# Patient Record
Sex: FEMALE | Race: WHITE | NOT HISPANIC OR LATINO | Employment: UNEMPLOYED | URBAN - METROPOLITAN AREA
[De-identification: names, ages, dates, MRNs, and addresses within clinical notes are randomized per-mention and may not be internally consistent; named-entity substitution may affect disease eponyms.]

---

## 2020-12-30 ENCOUNTER — ANESTHESIA EVENT (OUTPATIENT)
Dept: SURGERY | Facility: MEDICAL CENTER | Age: 50
DRG: 853 | End: 2020-12-30
Payer: COMMERCIAL

## 2020-12-30 ENCOUNTER — HOSPITAL ENCOUNTER (INPATIENT)
Facility: MEDICAL CENTER | Age: 50
LOS: 8 days | DRG: 853 | End: 2021-01-07
Attending: EMERGENCY MEDICINE | Admitting: SURGERY
Payer: COMMERCIAL

## 2020-12-30 ENCOUNTER — HOSPITAL ENCOUNTER (OUTPATIENT)
Dept: RADIOLOGY | Facility: MEDICAL CENTER | Age: 50
End: 2020-12-30
Payer: COMMERCIAL

## 2020-12-30 ENCOUNTER — ANESTHESIA (OUTPATIENT)
Dept: SURGERY | Facility: MEDICAL CENTER | Age: 50
DRG: 853 | End: 2020-12-30
Payer: COMMERCIAL

## 2020-12-30 DIAGNOSIS — A41.9 SEPSIS WITHOUT ACUTE ORGAN DYSFUNCTION, DUE TO UNSPECIFIED ORGANISM (HCC): ICD-10-CM

## 2020-12-30 DIAGNOSIS — U07.1 COVID-19: ICD-10-CM

## 2020-12-30 DIAGNOSIS — R19.8 PERFORATED VISCUS: ICD-10-CM

## 2020-12-30 DIAGNOSIS — K27.5 PERFORATED ULCER (HCC): ICD-10-CM

## 2020-12-30 DIAGNOSIS — J96.01 ACUTE RESPIRATORY FAILURE WITH HYPOXIA (HCC): ICD-10-CM

## 2020-12-30 DIAGNOSIS — A41.9 SEPTIC SHOCK (HCC): ICD-10-CM

## 2020-12-30 DIAGNOSIS — I15.9 SECONDARY HYPERTENSION: ICD-10-CM

## 2020-12-30 DIAGNOSIS — R65.21 SEPTIC SHOCK (HCC): ICD-10-CM

## 2020-12-30 LAB
ALBUMIN SERPL BCP-MCNC: 3.4 G/DL (ref 3.2–4.9)
ALBUMIN/GLOB SERPL: 1 G/DL
ALP SERPL-CCNC: 85 U/L (ref 30–99)
ALT SERPL-CCNC: 21 U/L (ref 2–50)
ANION GAP SERPL CALC-SCNC: 12 MMOL/L (ref 7–16)
ANISOCYTOSIS BLD QL SMEAR: ABNORMAL
AST SERPL-CCNC: 23 U/L (ref 12–45)
BASOPHILS # BLD AUTO: 0.2 % (ref 0–1.8)
BASOPHILS # BLD: 0.03 K/UL (ref 0–0.12)
BILIRUB SERPL-MCNC: <0.2 MG/DL (ref 0.1–1.5)
BUN SERPL-MCNC: 27 MG/DL (ref 8–22)
CALCIUM SERPL-MCNC: 8.1 MG/DL (ref 8.5–10.5)
CHLORIDE SERPL-SCNC: 112 MMOL/L (ref 96–112)
CO2 SERPL-SCNC: 16 MMOL/L (ref 20–33)
COMMENT 1642: NORMAL
CREAT SERPL-MCNC: 2.08 MG/DL (ref 0.5–1.4)
EKG IMPRESSION: NORMAL
EOSINOPHIL # BLD AUTO: 0 K/UL (ref 0–0.51)
EOSINOPHIL NFR BLD: 0 % (ref 0–6.9)
ERYTHROCYTE [DISTWIDTH] IN BLOOD BY AUTOMATED COUNT: 54.1 FL (ref 35.9–50)
GLOBULIN SER CALC-MCNC: 3.3 G/DL (ref 1.9–3.5)
GLUCOSE SERPL-MCNC: 186 MG/DL (ref 65–99)
HCT VFR BLD AUTO: 43.5 % (ref 37–47)
HGB BLD-MCNC: 13 G/DL (ref 12–16)
IMM GRANULOCYTES # BLD AUTO: 0.04 K/UL (ref 0–0.11)
IMM GRANULOCYTES NFR BLD AUTO: 0.3 % (ref 0–0.9)
LACTATE BLD-SCNC: 2.2 MMOL/L (ref 0.5–2)
LYMPHOCYTES # BLD AUTO: 1.01 K/UL (ref 1–4.8)
LYMPHOCYTES NFR BLD: 7.9 % (ref 22–41)
MCH RBC QN AUTO: 28.8 PG (ref 27–33)
MCHC RBC AUTO-ENTMCNC: 29.9 G/DL (ref 33.6–35)
MCV RBC AUTO: 96.2 FL (ref 81.4–97.8)
MONOCYTES # BLD AUTO: 0.43 K/UL (ref 0–0.85)
MONOCYTES NFR BLD AUTO: 3.4 % (ref 0–13.4)
MORPHOLOGY BLD-IMP: NORMAL
NEUTROPHILS # BLD AUTO: 11.21 K/UL (ref 2–7.15)
NEUTROPHILS NFR BLD: 88.2 % (ref 44–72)
NRBC # BLD AUTO: 0 K/UL
NRBC BLD-RTO: 0 /100 WBC
OVALOCYTES BLD QL SMEAR: NORMAL
PLATELET # BLD AUTO: 253 K/UL (ref 164–446)
PLATELET BLD QL SMEAR: NORMAL
PMV BLD AUTO: 10.9 FL (ref 9–12.9)
POIKILOCYTOSIS BLD QL SMEAR: NORMAL
POLYCHROMASIA BLD QL SMEAR: NORMAL
POTASSIUM SERPL-SCNC: 5.4 MMOL/L (ref 3.6–5.5)
PROT SERPL-MCNC: 6.7 G/DL (ref 6–8.2)
RBC # BLD AUTO: 4.52 M/UL (ref 4.2–5.4)
RBC BLD AUTO: PRESENT
SODIUM SERPL-SCNC: 140 MMOL/L (ref 135–145)
TROPONIN T SERPL-MCNC: 16 NG/L (ref 6–19)
WBC # BLD AUTO: 12.7 K/UL (ref 4.8–10.8)

## 2020-12-30 PROCEDURE — 700101 HCHG RX REV CODE 250: Performed by: EMERGENCY MEDICINE

## 2020-12-30 PROCEDURE — 84484 ASSAY OF TROPONIN QUANT: CPT

## 2020-12-30 PROCEDURE — 700101 HCHG RX REV CODE 250: Performed by: ANESTHESIOLOGY

## 2020-12-30 PROCEDURE — 96366 THER/PROPH/DIAG IV INF ADDON: CPT

## 2020-12-30 PROCEDURE — 500367 HCHG DRAIN KIT, HEMOVAC: Performed by: SURGERY

## 2020-12-30 PROCEDURE — 02HV33Z INSERTION OF INFUSION DEVICE INTO SUPERIOR VENA CAVA, PERCUTANEOUS APPROACH: ICD-10-PCS | Performed by: ANESTHESIOLOGY

## 2020-12-30 PROCEDURE — 160009 HCHG ANES TIME/MIN: Performed by: SURGERY

## 2020-12-30 PROCEDURE — 36415 COLL VENOUS BLD VENIPUNCTURE: CPT

## 2020-12-30 PROCEDURE — 160042 HCHG SURGERY MINUTES - EA ADDL 1 MIN LEVEL 5: Performed by: SURGERY

## 2020-12-30 PROCEDURE — 700111 HCHG RX REV CODE 636 W/ 250 OVERRIDE (IP): Performed by: ANESTHESIOLOGY

## 2020-12-30 PROCEDURE — 0DU40JZ SUPPLEMENT ESOPHAGOGASTRIC JUNCTION WITH SYNTHETIC SUBSTITUTE, OPEN APPROACH: ICD-10-PCS | Performed by: SURGERY

## 2020-12-30 PROCEDURE — C1765 ADHESION BARRIER: HCPCS | Performed by: SURGERY

## 2020-12-30 PROCEDURE — 99292 CRITICAL CARE ADDL 30 MIN: CPT | Performed by: INTERNAL MEDICINE

## 2020-12-30 PROCEDURE — 96365 THER/PROPH/DIAG IV INF INIT: CPT

## 2020-12-30 PROCEDURE — 160002 HCHG RECOVERY MINUTES (STAT): Performed by: SURGERY

## 2020-12-30 PROCEDURE — 501838 HCHG SUTURE GENERAL: Performed by: SURGERY

## 2020-12-30 PROCEDURE — 700105 HCHG RX REV CODE 258: Performed by: ANESTHESIOLOGY

## 2020-12-30 PROCEDURE — 80053 COMPREHEN METABOLIC PANEL: CPT

## 2020-12-30 PROCEDURE — 99291 CRITICAL CARE FIRST HOUR: CPT | Performed by: INTERNAL MEDICINE

## 2020-12-30 PROCEDURE — 160031 HCHG SURGERY MINUTES - 1ST 30 MINS LEVEL 5: Performed by: SURGERY

## 2020-12-30 PROCEDURE — 0FB00ZX EXCISION OF LIVER, OPEN APPROACH, DIAGNOSTIC: ICD-10-PCS | Performed by: SURGERY

## 2020-12-30 PROCEDURE — 502704 HCHG DEVICE, LIGASURE IMPACT: Performed by: SURGERY

## 2020-12-30 PROCEDURE — 87040 BLOOD CULTURE FOR BACTERIA: CPT

## 2020-12-30 PROCEDURE — 700105 HCHG RX REV CODE 258: Performed by: EMERGENCY MEDICINE

## 2020-12-30 PROCEDURE — 99291 CRITICAL CARE FIRST HOUR: CPT

## 2020-12-30 PROCEDURE — 85025 COMPLETE CBC W/AUTO DIFF WBC: CPT

## 2020-12-30 PROCEDURE — 160035 HCHG PACU - 1ST 60 MINS PHASE I: Performed by: SURGERY

## 2020-12-30 PROCEDURE — 88307 TISSUE EXAM BY PATHOLOGIST: CPT

## 2020-12-30 PROCEDURE — 93005 ELECTROCARDIOGRAM TRACING: CPT | Performed by: EMERGENCY MEDICINE

## 2020-12-30 PROCEDURE — 770022 HCHG ROOM/CARE - ICU (200)

## 2020-12-30 PROCEDURE — 83605 ASSAY OF LACTIC ACID: CPT

## 2020-12-30 PROCEDURE — 03HY32Z INSERTION OF MONITORING DEVICE INTO UPPER ARTERY, PERCUTANEOUS APPROACH: ICD-10-PCS | Performed by: ANESTHESIOLOGY

## 2020-12-30 PROCEDURE — 160048 HCHG OR STATISTICAL LEVEL 1-5: Performed by: SURGERY

## 2020-12-30 RX ORDER — ETOMIDATE 2 MG/ML
INJECTION INTRAVENOUS PRN
Status: DISCONTINUED | OUTPATIENT
Start: 2020-12-30 | End: 2020-12-31 | Stop reason: SURG

## 2020-12-30 RX ORDER — SODIUM CHLORIDE, SODIUM LACTATE, POTASSIUM CHLORIDE, CALCIUM CHLORIDE 600; 310; 30; 20 MG/100ML; MG/100ML; MG/100ML; MG/100ML
INJECTION, SOLUTION INTRAVENOUS
Status: DISCONTINUED | OUTPATIENT
Start: 2020-12-30 | End: 2020-12-31 | Stop reason: SURG

## 2020-12-30 RX ORDER — DEXAMETHASONE SODIUM PHOSPHATE 4 MG/ML
INJECTION, SOLUTION INTRA-ARTICULAR; INTRALESIONAL; INTRAMUSCULAR; INTRAVENOUS; SOFT TISSUE PRN
Status: DISCONTINUED | OUTPATIENT
Start: 2020-12-30 | End: 2020-12-31 | Stop reason: SURG

## 2020-12-30 RX ORDER — NOREPINEPHRINE BITARTRATE 0.03 MG/ML
10 INJECTION, SOLUTION INTRAVENOUS ONCE
Status: COMPLETED | OUTPATIENT
Start: 2020-12-30 | End: 2020-12-30

## 2020-12-30 RX ORDER — SUCCINYLCHOLINE/SOD CL,ISO/PF 200MG/10ML
SYRINGE (ML) INTRAVENOUS PRN
Status: DISCONTINUED | OUTPATIENT
Start: 2020-12-30 | End: 2020-12-31 | Stop reason: SURG

## 2020-12-30 RX ORDER — PHENYLEPHRINE HCL IN 0.9% NACL 0.5 MG/5ML
SYRINGE (ML) INTRAVENOUS PRN
Status: DISCONTINUED | OUTPATIENT
Start: 2020-12-30 | End: 2020-12-31 | Stop reason: SURG

## 2020-12-30 RX ORDER — CEFOTETAN DISODIUM 2 G/20ML
INJECTION, POWDER, FOR SOLUTION INTRAMUSCULAR; INTRAVENOUS PRN
Status: DISCONTINUED | OUTPATIENT
Start: 2020-12-30 | End: 2020-12-31 | Stop reason: SURG

## 2020-12-30 RX ORDER — LIDOCAINE HYDROCHLORIDE 20 MG/ML
INJECTION, SOLUTION EPIDURAL; INFILTRATION; INTRACAUDAL; PERINEURAL PRN
Status: DISCONTINUED | OUTPATIENT
Start: 2020-12-30 | End: 2020-12-31 | Stop reason: SURG

## 2020-12-30 RX ORDER — ROCURONIUM BROMIDE 10 MG/ML
INJECTION, SOLUTION INTRAVENOUS PRN
Status: DISCONTINUED | OUTPATIENT
Start: 2020-12-30 | End: 2020-12-31 | Stop reason: SURG

## 2020-12-30 RX ORDER — ONDANSETRON 2 MG/ML
INJECTION INTRAMUSCULAR; INTRAVENOUS PRN
Status: DISCONTINUED | OUTPATIENT
Start: 2020-12-30 | End: 2020-12-31 | Stop reason: SURG

## 2020-12-30 RX ADMIN — Medication 140 MG: at 22:22

## 2020-12-30 RX ADMIN — ROCURONIUM BROMIDE 45 MG: 10 INJECTION, SOLUTION INTRAVENOUS at 22:25

## 2020-12-30 RX ADMIN — SODIUM CHLORIDE, POTASSIUM CHLORIDE, SODIUM LACTATE AND CALCIUM CHLORIDE: 600; 310; 30; 20 INJECTION, SOLUTION INTRAVENOUS at 22:09

## 2020-12-30 RX ADMIN — SUGAMMADEX 200 MG: 100 INJECTION, SOLUTION INTRAVENOUS at 23:48

## 2020-12-30 RX ADMIN — ETOMIDATE 10 MG: 2 INJECTION INTRAVENOUS at 22:22

## 2020-12-30 RX ADMIN — DEXAMETHASONE SODIUM PHOSPHATE 4 MG: 4 INJECTION, SOLUTION INTRA-ARTICULAR; INTRALESIONAL; INTRAMUSCULAR; INTRAVENOUS; SOFT TISSUE at 22:25

## 2020-12-30 RX ADMIN — NOREPINEPHRINE BITARTRATE 10 MCG/MIN: 1 INJECTION, SOLUTION, CONCENTRATE INTRAVENOUS at 20:50

## 2020-12-30 RX ADMIN — Medication 100 MCG: at 22:42

## 2020-12-30 RX ADMIN — ONDANSETRON 4 MG: 2 INJECTION INTRAMUSCULAR; INTRAVENOUS at 22:25

## 2020-12-30 RX ADMIN — FENTANYL CITRATE 50 MCG: 50 INJECTION, SOLUTION INTRAMUSCULAR; INTRAVENOUS at 23:53

## 2020-12-30 RX ADMIN — Medication 100 MCG: at 22:47

## 2020-12-30 RX ADMIN — CEFOTETAN DISODIUM 2 G: 2 INJECTION, POWDER, FOR SOLUTION INTRAMUSCULAR; INTRAVENOUS at 22:31

## 2020-12-30 RX ADMIN — FENTANYL CITRATE 50 MCG: 50 INJECTION, SOLUTION INTRAMUSCULAR; INTRAVENOUS at 23:50

## 2020-12-30 RX ADMIN — NOREPINEPHRINE BITARTRATE 15 MCG/MIN: 1 INJECTION, SOLUTION, CONCENTRATE INTRAVENOUS at 22:09

## 2020-12-30 RX ADMIN — LIDOCAINE HYDROCHLORIDE 5 ML: 20 INJECTION, SOLUTION EPIDURAL; INFILTRATION; INTRACAUDAL at 22:22

## 2020-12-30 RX ADMIN — ROCURONIUM BROMIDE 5 MG: 10 INJECTION, SOLUTION INTRAVENOUS at 22:19

## 2020-12-31 ENCOUNTER — APPOINTMENT (OUTPATIENT)
Dept: RADIOLOGY | Facility: MEDICAL CENTER | Age: 50
DRG: 853 | End: 2020-12-31
Attending: ANESTHESIOLOGY
Payer: COMMERCIAL

## 2020-12-31 ENCOUNTER — APPOINTMENT (OUTPATIENT)
Dept: CARDIOLOGY | Facility: MEDICAL CENTER | Age: 50
DRG: 853 | End: 2020-12-31
Attending: INTERNAL MEDICINE
Payer: COMMERCIAL

## 2020-12-31 PROBLEM — N17.9 AKI (ACUTE KIDNEY INJURY) (HCC): Status: ACTIVE | Noted: 2020-12-31

## 2020-12-31 PROBLEM — U07.1 COVID-19: Status: ACTIVE | Noted: 2020-12-31

## 2020-12-31 PROBLEM — E66.01 CLASS 3 SEVERE OBESITY IN ADULT (HCC): Status: ACTIVE | Noted: 2020-12-31

## 2020-12-31 PROBLEM — K27.5 PERFORATED ULCER (HCC): Status: ACTIVE | Noted: 2020-12-31

## 2020-12-31 PROBLEM — A41.9 SEPTIC SHOCK (HCC): Status: ACTIVE | Noted: 2020-12-31

## 2020-12-31 PROBLEM — E11.9 DM (DIABETES MELLITUS) (HCC): Status: ACTIVE | Noted: 2020-12-31

## 2020-12-31 PROBLEM — R65.21 SEPTIC SHOCK (HCC): Status: ACTIVE | Noted: 2020-12-31

## 2020-12-31 PROBLEM — R06.1 STRIDOR: Status: ACTIVE | Noted: 2020-12-31

## 2020-12-31 LAB
ALBUMIN SERPL BCP-MCNC: 3.1 G/DL (ref 3.2–4.9)
ALBUMIN/GLOB SERPL: 0.9 G/DL
ALP SERPL-CCNC: 75 U/L (ref 30–99)
ALT SERPL-CCNC: 29 U/L (ref 2–50)
ANION GAP SERPL CALC-SCNC: 5 MMOL/L (ref 7–16)
APPEARANCE UR: ABNORMAL
AST SERPL-CCNC: 34 U/L (ref 12–45)
BASOPHILS # BLD AUTO: 0.3 % (ref 0–1.8)
BASOPHILS # BLD: 0.05 K/UL (ref 0–0.12)
BILIRUB SERPL-MCNC: <0.2 MG/DL (ref 0.1–1.5)
BILIRUB UR QL STRIP.AUTO: NEGATIVE
BUN SERPL-MCNC: 29 MG/DL (ref 8–22)
CALCIUM SERPL-MCNC: 8 MG/DL (ref 8.5–10.5)
CHLORIDE SERPL-SCNC: 111 MMOL/L (ref 96–112)
CK SERPL-CCNC: 59 U/L (ref 0–154)
CO2 SERPL-SCNC: 18 MMOL/L (ref 20–33)
COLOR UR: YELLOW
CREAT SERPL-MCNC: 1.81 MG/DL (ref 0.5–1.4)
EOSINOPHIL # BLD AUTO: 0 K/UL (ref 0–0.51)
EOSINOPHIL NFR BLD: 0 % (ref 0–6.9)
EPI CELLS #/AREA URNS HPF: ABNORMAL /HPF
ERYTHROCYTE [DISTWIDTH] IN BLOOD BY AUTOMATED COUNT: 54 FL (ref 35.9–50)
EST. AVERAGE GLUCOSE BLD GHB EST-MCNC: 123 MG/DL
GLOBULIN SER CALC-MCNC: 3.3 G/DL (ref 1.9–3.5)
GLUCOSE BLD-MCNC: 148 MG/DL (ref 65–99)
GLUCOSE BLD-MCNC: 154 MG/DL (ref 65–99)
GLUCOSE BLD-MCNC: 209 MG/DL (ref 65–99)
GLUCOSE BLD-MCNC: 214 MG/DL (ref 65–99)
GLUCOSE SERPL-MCNC: 203 MG/DL (ref 65–99)
GLUCOSE UR STRIP.AUTO-MCNC: >=1000 MG/DL
GRAN CASTS #/AREA URNS LPF: ABNORMAL /LPF
HBA1C MFR BLD: 5.9 % (ref 0–5.6)
HCT VFR BLD AUTO: 40.3 % (ref 37–47)
HGB BLD-MCNC: 12.3 G/DL (ref 12–16)
IMM GRANULOCYTES # BLD AUTO: 0.1 K/UL (ref 0–0.11)
IMM GRANULOCYTES NFR BLD AUTO: 0.6 % (ref 0–0.9)
INR PPP: 1.16 (ref 0.87–1.13)
KETONES UR STRIP.AUTO-MCNC: NEGATIVE MG/DL
LACTATE BLD-SCNC: 0.9 MMOL/L (ref 0.5–2)
LACTATE BLD-SCNC: 1.2 MMOL/L (ref 0.5–2)
LEUKOCYTE ESTERASE UR QL STRIP.AUTO: NEGATIVE
LYMPHOCYTES # BLD AUTO: 1.43 K/UL (ref 1–4.8)
LYMPHOCYTES NFR BLD: 8.3 % (ref 22–41)
MAGNESIUM SERPL-MCNC: 2.8 MG/DL (ref 1.5–2.5)
MCH RBC QN AUTO: 29.1 PG (ref 27–33)
MCHC RBC AUTO-ENTMCNC: 30.5 G/DL (ref 33.6–35)
MCV RBC AUTO: 95.3 FL (ref 81.4–97.8)
MICRO URNS: ABNORMAL
MONOCYTES # BLD AUTO: 0.57 K/UL (ref 0–0.85)
MONOCYTES NFR BLD AUTO: 3.3 % (ref 0–13.4)
NEUTROPHILS # BLD AUTO: 15.16 K/UL (ref 2–7.15)
NEUTROPHILS NFR BLD: 87.5 % (ref 44–72)
NITRITE UR QL STRIP.AUTO: NEGATIVE
NRBC # BLD AUTO: 0 K/UL
NRBC BLD-RTO: 0 /100 WBC
PATHOLOGY CONSULT NOTE: NORMAL
PH UR STRIP.AUTO: 5 [PH] (ref 5–8)
PHOSPHATE SERPL-MCNC: 3.3 MG/DL (ref 2.5–4.5)
PLATELET # BLD AUTO: 221 K/UL (ref 164–446)
PMV BLD AUTO: 10.5 FL (ref 9–12.9)
POTASSIUM SERPL-SCNC: 5.1 MMOL/L (ref 3.6–5.5)
PROCALCITONIN SERPL-MCNC: 34.96 NG/ML
PROT SERPL-MCNC: 6.4 G/DL (ref 6–8.2)
PROT UR QL STRIP: 30 MG/DL
PROTHROMBIN TIME: 15.1 SEC (ref 12–14.6)
RBC # BLD AUTO: 4.23 M/UL (ref 4.2–5.4)
RBC # URNS HPF: ABNORMAL /HPF
RBC UR QL AUTO: ABNORMAL
SODIUM SERPL-SCNC: 134 MMOL/L (ref 135–145)
SP GR UR STRIP.AUTO: 1.03
UROBILINOGEN UR STRIP.AUTO-MCNC: 0.2 MG/DL
WBC # BLD AUTO: 17.3 K/UL (ref 4.8–10.8)
WBC #/AREA URNS HPF: ABNORMAL /HPF

## 2020-12-31 PROCEDURE — 84100 ASSAY OF PHOSPHORUS: CPT

## 2020-12-31 PROCEDURE — 87086 URINE CULTURE/COLONY COUNT: CPT

## 2020-12-31 PROCEDURE — 93308 TTE F-UP OR LMTD: CPT

## 2020-12-31 PROCEDURE — 85025 COMPLETE CBC W/AUTO DIFF WBC: CPT

## 2020-12-31 PROCEDURE — 700105 HCHG RX REV CODE 258: Performed by: INTERNAL MEDICINE

## 2020-12-31 PROCEDURE — 770022 HCHG ROOM/CARE - ICU (200)

## 2020-12-31 PROCEDURE — 700105 HCHG RX REV CODE 258: Performed by: SURGERY

## 2020-12-31 PROCEDURE — 99291 CRITICAL CARE FIRST HOUR: CPT | Performed by: INTERNAL MEDICINE

## 2020-12-31 PROCEDURE — 94640 AIRWAY INHALATION TREATMENT: CPT

## 2020-12-31 PROCEDURE — 700101 HCHG RX REV CODE 250: Performed by: INTERNAL MEDICINE

## 2020-12-31 PROCEDURE — A9270 NON-COVERED ITEM OR SERVICE: HCPCS | Performed by: INTERNAL MEDICINE

## 2020-12-31 PROCEDURE — 700102 HCHG RX REV CODE 250 W/ 637 OVERRIDE(OP): Performed by: INTERNAL MEDICINE

## 2020-12-31 PROCEDURE — 700111 HCHG RX REV CODE 636 W/ 250 OVERRIDE (IP): Performed by: SURGERY

## 2020-12-31 PROCEDURE — 81001 URINALYSIS AUTO W/SCOPE: CPT

## 2020-12-31 PROCEDURE — 82962 GLUCOSE BLOOD TEST: CPT | Mod: 91

## 2020-12-31 PROCEDURE — 83036 HEMOGLOBIN GLYCOSYLATED A1C: CPT

## 2020-12-31 PROCEDURE — 84145 PROCALCITONIN (PCT): CPT

## 2020-12-31 PROCEDURE — 80053 COMPREHEN METABOLIC PANEL: CPT

## 2020-12-31 PROCEDURE — 700111 HCHG RX REV CODE 636 W/ 250 OVERRIDE (IP): Performed by: INTERNAL MEDICINE

## 2020-12-31 PROCEDURE — 71045 X-RAY EXAM CHEST 1 VIEW: CPT

## 2020-12-31 PROCEDURE — C9113 INJ PANTOPRAZOLE SODIUM, VIA: HCPCS | Performed by: SURGERY

## 2020-12-31 PROCEDURE — 85610 PROTHROMBIN TIME: CPT

## 2020-12-31 PROCEDURE — 83735 ASSAY OF MAGNESIUM: CPT

## 2020-12-31 PROCEDURE — 83605 ASSAY OF LACTIC ACID: CPT | Mod: 91

## 2020-12-31 PROCEDURE — 82550 ASSAY OF CK (CPK): CPT

## 2020-12-31 RX ORDER — ONDANSETRON 2 MG/ML
4 INJECTION INTRAMUSCULAR; INTRAVENOUS
Status: DISCONTINUED | OUTPATIENT
Start: 2020-12-31 | End: 2020-12-31 | Stop reason: HOSPADM

## 2020-12-31 RX ORDER — OXYCODONE HCL 5 MG/5 ML
5 SOLUTION, ORAL ORAL
Status: DISCONTINUED | OUTPATIENT
Start: 2020-12-31 | End: 2020-12-31 | Stop reason: HOSPADM

## 2020-12-31 RX ORDER — HYDROMORPHONE HYDROCHLORIDE 1 MG/ML
0.4 INJECTION, SOLUTION INTRAMUSCULAR; INTRAVENOUS; SUBCUTANEOUS
Status: DISCONTINUED | OUTPATIENT
Start: 2020-12-31 | End: 2020-12-31 | Stop reason: HOSPADM

## 2020-12-31 RX ORDER — DEXTROSE MONOHYDRATE 25 G/50ML
50 INJECTION, SOLUTION INTRAVENOUS
Status: DISCONTINUED | OUTPATIENT
Start: 2020-12-31 | End: 2021-01-07 | Stop reason: HOSPADM

## 2020-12-31 RX ORDER — MIDAZOLAM HYDROCHLORIDE 1 MG/ML
1 INJECTION INTRAMUSCULAR; INTRAVENOUS
Status: DISCONTINUED | OUTPATIENT
Start: 2020-12-31 | End: 2020-12-31 | Stop reason: HOSPADM

## 2020-12-31 RX ORDER — BISACODYL 10 MG
10 SUPPOSITORY, RECTAL RECTAL
Status: DISCONTINUED | OUTPATIENT
Start: 2020-12-31 | End: 2021-01-04

## 2020-12-31 RX ORDER — HYDROMORPHONE HYDROCHLORIDE 1 MG/ML
0.1 INJECTION, SOLUTION INTRAMUSCULAR; INTRAVENOUS; SUBCUTANEOUS
Status: DISCONTINUED | OUTPATIENT
Start: 2020-12-31 | End: 2020-12-31 | Stop reason: HOSPADM

## 2020-12-31 RX ORDER — DEXAMETHASONE SODIUM PHOSPHATE 4 MG/ML
4 INJECTION, SOLUTION INTRA-ARTICULAR; INTRALESIONAL; INTRAMUSCULAR; INTRAVENOUS; SOFT TISSUE ONCE
Status: COMPLETED | OUTPATIENT
Start: 2020-12-31 | End: 2020-12-31

## 2020-12-31 RX ORDER — SODIUM CHLORIDE, SODIUM LACTATE, POTASSIUM CHLORIDE, CALCIUM CHLORIDE 600; 310; 30; 20 MG/100ML; MG/100ML; MG/100ML; MG/100ML
INJECTION, SOLUTION INTRAVENOUS CONTINUOUS
Status: DISCONTINUED | OUTPATIENT
Start: 2020-12-31 | End: 2020-12-31 | Stop reason: HOSPADM

## 2020-12-31 RX ORDER — DEXTROSE MONOHYDRATE 25 G/50ML
50 INJECTION, SOLUTION INTRAVENOUS
Status: DISCONTINUED | OUTPATIENT
Start: 2020-12-31 | End: 2020-12-31 | Stop reason: HOSPADM

## 2020-12-31 RX ORDER — DEXTROSE MONOHYDRATE 25 G/50ML
50 INJECTION, SOLUTION INTRAVENOUS
Status: DISCONTINUED | OUTPATIENT
Start: 2020-12-31 | End: 2020-12-31

## 2020-12-31 RX ORDER — NOREPINEPHRINE BITARTRATE 0.03 MG/ML
0-30 INJECTION, SOLUTION INTRAVENOUS CONTINUOUS
Status: DISCONTINUED | OUTPATIENT
Start: 2020-12-31 | End: 2021-01-03

## 2020-12-31 RX ORDER — IPRATROPIUM BROMIDE AND ALBUTEROL SULFATE 2.5; .5 MG/3ML; MG/3ML
3 SOLUTION RESPIRATORY (INHALATION)
Status: DISCONTINUED | OUTPATIENT
Start: 2020-12-31 | End: 2021-01-07 | Stop reason: HOSPADM

## 2020-12-31 RX ORDER — INSULIN GLARGINE 100 [IU]/ML
0.2 INJECTION, SOLUTION SUBCUTANEOUS EVERY EVENING
Status: DISCONTINUED | OUTPATIENT
Start: 2020-12-31 | End: 2020-12-31

## 2020-12-31 RX ORDER — MEPERIDINE HYDROCHLORIDE 25 MG/ML
12.5 INJECTION INTRAMUSCULAR; INTRAVENOUS; SUBCUTANEOUS
Status: DISCONTINUED | OUTPATIENT
Start: 2020-12-31 | End: 2020-12-31 | Stop reason: HOSPADM

## 2020-12-31 RX ORDER — POLYETHYLENE GLYCOL 3350 17 G/17G
1 POWDER, FOR SOLUTION ORAL
Status: DISCONTINUED | OUTPATIENT
Start: 2020-12-31 | End: 2021-01-04

## 2020-12-31 RX ORDER — DIPHENHYDRAMINE HYDROCHLORIDE 50 MG/ML
12.5 INJECTION INTRAMUSCULAR; INTRAVENOUS
Status: DISCONTINUED | OUTPATIENT
Start: 2020-12-31 | End: 2020-12-31 | Stop reason: HOSPADM

## 2020-12-31 RX ORDER — HYDROMORPHONE HYDROCHLORIDE 1 MG/ML
0.2 INJECTION, SOLUTION INTRAMUSCULAR; INTRAVENOUS; SUBCUTANEOUS
Status: DISCONTINUED | OUTPATIENT
Start: 2020-12-31 | End: 2020-12-31 | Stop reason: HOSPADM

## 2020-12-31 RX ORDER — HALOPERIDOL 5 MG/ML
1 INJECTION INTRAMUSCULAR
Status: DISCONTINUED | OUTPATIENT
Start: 2020-12-31 | End: 2020-12-31 | Stop reason: HOSPADM

## 2020-12-31 RX ORDER — OXYCODONE HCL 5 MG/5 ML
10 SOLUTION, ORAL ORAL
Status: DISCONTINUED | OUTPATIENT
Start: 2020-12-31 | End: 2020-12-31 | Stop reason: HOSPADM

## 2020-12-31 RX ORDER — SODIUM CHLORIDE, SODIUM LACTATE, POTASSIUM CHLORIDE, CALCIUM CHLORIDE 600; 310; 30; 20 MG/100ML; MG/100ML; MG/100ML; MG/100ML
2000 INJECTION, SOLUTION INTRAVENOUS CONTINUOUS
Status: ACTIVE | OUTPATIENT
Start: 2020-12-31 | End: 2021-01-01

## 2020-12-31 RX ORDER — AMOXICILLIN 250 MG
2 CAPSULE ORAL 2 TIMES DAILY
Status: DISCONTINUED | OUTPATIENT
Start: 2020-12-31 | End: 2021-01-04

## 2020-12-31 RX ORDER — LABETALOL HYDROCHLORIDE 5 MG/ML
5 INJECTION, SOLUTION INTRAVENOUS
Status: DISCONTINUED | OUTPATIENT
Start: 2020-12-31 | End: 2020-12-31 | Stop reason: HOSPADM

## 2020-12-31 RX ADMIN — PIPERACILLIN AND TAZOBACTAM 4.5 G: 4; .5 INJECTION, POWDER, LYOPHILIZED, FOR SOLUTION INTRAVENOUS; PARENTERAL at 03:12

## 2020-12-31 RX ADMIN — PANTOPRAZOLE SODIUM 8 MG/HR: 40 INJECTION, POWDER, FOR SOLUTION INTRAVENOUS at 14:33

## 2020-12-31 RX ADMIN — NOREPINEPHRINE BITARTRATE 7 MCG/MIN: 1 INJECTION, SOLUTION, CONCENTRATE INTRAVENOUS at 23:30

## 2020-12-31 RX ADMIN — SODIUM CHLORIDE, POTASSIUM CHLORIDE, SODIUM LACTATE AND CALCIUM CHLORIDE 1000 ML: 600; 310; 30; 20 INJECTION, SOLUTION INTRAVENOUS at 04:02

## 2020-12-31 RX ADMIN — RACEPINEPHRINE HYDROCHLORIDE 0.5 ML: 11.25 SOLUTION RESPIRATORY (INHALATION) at 01:25

## 2020-12-31 RX ADMIN — PIPERACILLIN AND TAZOBACTAM 4.5 G: 4; .5 INJECTION, POWDER, LYOPHILIZED, FOR SOLUTION INTRAVENOUS; PARENTERAL at 06:23

## 2020-12-31 RX ADMIN — INSULIN LISPRO 3 UNITS: 100 INJECTION, SOLUTION INTRAVENOUS; SUBCUTANEOUS at 06:29

## 2020-12-31 RX ADMIN — NOREPINEPHRINE BITARTRATE 14 MCG/MIN: 1 INJECTION, SOLUTION, CONCENTRATE INTRAVENOUS at 10:46

## 2020-12-31 RX ADMIN — INSULIN LISPRO 3 UNITS: 100 INJECTION, SOLUTION INTRAVENOUS; SUBCUTANEOUS at 03:47

## 2020-12-31 RX ADMIN — PIPERACILLIN AND TAZOBACTAM 4.5 G: 4; .5 INJECTION, POWDER, LYOPHILIZED, FOR SOLUTION INTRAVENOUS; PARENTERAL at 14:30

## 2020-12-31 RX ADMIN — SODIUM CHLORIDE, POTASSIUM CHLORIDE, SODIUM LACTATE AND CALCIUM CHLORIDE 2000 ML: 600; 310; 30; 20 INJECTION, SOLUTION INTRAVENOUS at 14:00

## 2020-12-31 RX ADMIN — PIPERACILLIN AND TAZOBACTAM 4.5 G: 4; .5 INJECTION, POWDER, LYOPHILIZED, FOR SOLUTION INTRAVENOUS; PARENTERAL at 22:56

## 2020-12-31 RX ADMIN — FENTANYL CITRATE 25 MCG: 50 INJECTION, SOLUTION INTRAMUSCULAR; INTRAVENOUS at 19:57

## 2020-12-31 RX ADMIN — FENTANYL CITRATE 50 MCG: 50 INJECTION, SOLUTION INTRAMUSCULAR; INTRAVENOUS at 03:05

## 2020-12-31 RX ADMIN — FENTANYL CITRATE 25 MCG: 50 INJECTION, SOLUTION INTRAMUSCULAR; INTRAVENOUS at 22:55

## 2020-12-31 RX ADMIN — DEXAMETHASONE SODIUM PHOSPHATE 4 MG: 4 INJECTION, SOLUTION INTRA-ARTICULAR; INTRALESIONAL; INTRAMUSCULAR; INTRAVENOUS; SOFT TISSUE at 01:26

## 2020-12-31 RX ADMIN — FENTANYL CITRATE 25 MCG: 50 INJECTION, SOLUTION INTRAMUSCULAR; INTRAVENOUS at 14:48

## 2020-12-31 ASSESSMENT — PAIN SCALES - GENERAL: PAIN_LEVEL: 5

## 2020-12-31 ASSESSMENT — PAIN SCALES - WONG BAKER
WONGBAKER_NUMERICALRESPONSE: DOESN'T HURT AT ALL
WONGBAKER_NUMERICALRESPONSE: DOESN'T HURT AT ALL

## 2020-12-31 ASSESSMENT — FIBROSIS 4 INDEX: FIB4 SCORE: 1.03

## 2020-12-31 NOTE — ASSESSMENT & PLAN NOTE
+Covid on intial CT no disease seen  Severe obesity high risk for worsening disease state  Maintain euvolemia  Hold on dexamethasone with marginal ulcer and poor wound healing in the obese female  Now only on 2 L o2, not necessary

## 2020-12-31 NOTE — OR NURSING
Pt's blood pressure dropped so bolus initiated with LR and Levophed restarted.  Pt awake and nods head no to pain.  NG in right nare.  Pt suctioned orally with Yankauer of small amount of thick secretions.

## 2020-12-31 NOTE — CONSULTS
"    DATE OF CONSULTATION:  12/30/2020     REFERRING PHYSICIAN:   RADHA Morejon DO    CONSULTING PHYSICIAN:  Ari Romero M.D.     REASON FOR CONSULTATION:  pneumoperitoneum    HISTORY OF PRESENT ILLNESS:   50-year-old female with a history of a Jf-en-Y gastric bypass performed in the 1990s, had acute onset of diffuse abdominal pain earlier today and went to an outside ER where CT scan showed pneumoperitoneum and she was transferred here.  She is Covid positive, she has a BMI of 59, she is on Levophed, she is alert and conversant.  She reports that her pain is predominantly bilateral lower quadrants and she also has some left shoulder pain.  She says she is not having much upper abdominal pain to speak of.    PAST MEDICAL HISTORY:     No known past medical history    PAST SURGICAL HISTORY:   Jf-en-Y gastric bypass in the 1990s     ALLERGIES: Not on File     CURRENT MEDICATIONS:   Home Medications    **Home medications have not yet been reviewed for this encounter**         FAMILY HISTORY: No family history on file.    SOCIAL HISTORY:   Social History     Tobacco Use   • Smoking status: Not on file   Substance and Sexual Activity   • Alcohol use: Not on file   • Drug use: Not on file   • Sexual activity: Not on file       REVIEW OF SYSTEMS:   Noncontributory except as per HPI      PHYSICAL EXAMINATION:   General Appearance: The patient is an obese female appearing stated age, mild distress from abdominal pain  VITAL SIGNS: BP (!) 85/37   Pulse 98   Resp (!) 25   Ht 1.702 m (5' 7\")   Wt (!) 172.4 kg (380 lb)   SpO2 98%   HEAD AND NECK: Demonstrates symmetric, reactive pupils. Extraocular muscles   are intact. Nares and oropharynx are clear.   NECK: Supple. No adenopathy.  CHEST:    Inspection: Unlabored respirations, no intercostal retractions, paradoxical motion, or accessory muscle use.   Palpation:  The chest is nontender.    Auscultation: clear to auscultation.  CARDIOVASCULAR:   Inspection: " The skin is warm.  Auscultation: Regular rate and rhythm.   Peripheral Pulses: Normal.    ABDOMEN:   Inspection: Abdominal inspection reveals no abrasions, contusions, lacerations or penetrating wounds.   Palpation: Palpation is remarkable for diffuse tenderness and rebound tenderness.  Healed port site incisions  EXTREMITIES:   Examination of the upper and lower extremities demonstrates No cyanosis, edema, or clubbing of the nails.  NEUROLOGIC:   Neurologic examination reveals no focal deficits noted.  PSYCHIATRIC:   The patient oriented to time, place, person.    LABORATORY VALUES:   Recent Labs     12/30/20 2002   WBC 12.7*   RBC 4.52   HEMOGLOBIN 13.0   HEMATOCRIT 43.5   MCV 96.2   MCH 28.8   MCHC 29.9*   RDW 54.1*   PLATELETCT 253   MPV 10.9     Recent Labs     12/30/20 2002   SODIUM 140   POTASSIUM 5.4   CHLORIDE 112   CO2 16*   GLUCOSE 186*   BUN 27*   CREATININE 2.08*   CALCIUM 8.1*     Recent Labs     12/30/20 2002   ASTSGOT 23   ALTSGPT 21   TBILIRUBIN <0.2   ALKPHOSPHAT 85   GLOBULIN 3.3            IMAGING:   CT scan from outside hospital shows pneumoperitoneum with small scattered air bubbles in the upper abdomen.  There are no significant normalities in the chest and the lungs are well-expanded with no effusion      IMPRESSION AND PLAN:  -Pneumoperitoneum   OR tonight for exploration    -Septic shock   Admission to ICU postoperatively    -Covid positive   Isolation precautions         ____________________________________     Ari Romero M.D.    DD: 12/30/2020  11:51 PM

## 2020-12-31 NOTE — ASSESSMENT & PLAN NOTE
Post operative stridor  S/p dexemethason and racemic epinephrine  Monitor need for emergent intubation

## 2020-12-31 NOTE — ED NOTES
Pt was tested for covid today at the [facility]. She is COVID positive. Lab result was scanned in to [facility]. Bed control notified.

## 2020-12-31 NOTE — ASSESSMENT & PLAN NOTE
Perforated marginal ulcer s/p modified chanelle patch by Dr Aquino 12/31  NG tube cont per surgery  NPO  Zosyn for 3 days  Serial monitor abdominal exam  On fluids w/ d5w

## 2020-12-31 NOTE — ANESTHESIA POSTPROCEDURE EVALUATION
Patient: Maria Hamman    Procedure Summary     Date: 12/30/20 Room / Location: [place] [address] / SURGERY [facility]    Anesthesia Start: 2209 Anesthesia Stop: 12/31/20 0008    Procedure: LAPAROTOMY, EXPLORATORY, with patch (Abdomen) Diagnosis: (perforated ulcer)    Surgeons: Ari Romero M.D. Responsible Provider: Thais Montejo M.D.    Anesthesia Type: general ASA Status: 4 - Emergent          Final Anesthesia Type: general  Last vitals  BP   Blood Pressure: (!) 85/37    Temp        Pulse   Pulse: 98   Resp   (!) 25    SpO2   98 %      Anesthesia Post Evaluation    Patient location during evaluation: PACU  Patient participation: complete - patient participated  Level of consciousness: awake and alert  Pain score: 5    Airway patency: patent  Anesthetic complications: no  Cardiovascular status: adequate and hypotensive  Respiratory status: acceptable, face mask and oral airway  Hydration status: hypovolemic    PONV: none

## 2020-12-31 NOTE — PROGRESS NOTES
"    DATE: 12/31/2020    Post Operative Day  1 exploratory laparotomy and oversew of perforated GJ ulcer.    Interval Events:  SP ex lap. Still on pressors. Extubated but tenuous.    PHYSICAL EXAMINATION:  Constitutional:     Vital Signs: /48   Pulse 83   Temp 37.8 °C (100 °F) (Bladder)   Resp 18   Ht 1.702 m (5' 7\")   Wt (!) 156 kg (343 lb 14.7 oz)   SpO2 97%    General Appearance: The patient is a cooperative woman in mild distress.   Respiratory:   Inspection: Labored respirations and accessory muscle use. Upper airway congestion    .  Cardiovascular:   Inspection: The skin is warm.     Abdomen:   Inspection: Abdominal inspection reveals provena on incision.   Palpation: Palpation is remarkable for moderate tenderness in the generalized region.     Laboratory Values:   Recent Labs     12/30/20 2002 12/31/20  0955   WBC 12.7* 17.3*   RBC 4.52 4.23   HEMOGLOBIN 13.0 12.3   HEMATOCRIT 43.5 40.3   MCV 96.2 95.3   MCH 28.8 29.1   MCHC 29.9* 30.5*   RDW 54.1* 54.0*   PLATELETCT 253 221   MPV 10.9 10.5     Recent Labs     12/30/20 2002 12/31/20  0955   SODIUM 140 134*   POTASSIUM 5.4 5.1   CHLORIDE 112 111   CO2 16* 18*   GLUCOSE 186* 203*   BUN 27* 29*   CREATININE 2.08* 1.81*   CALCIUM 8.1* 8.0*     Recent Labs     12/30/20 2002 12/31/20  0955   ASTSGOT 23 34   ALTSGPT 21 29   TBILIRUBIN <0.2 <0.2   ALKPHOSPHAT 85 75   GLOBULIN 3.3 3.3   INR  --  1.16*     Recent Labs     12/31/20  0955   INR 1.16*        Imaging:   DX-CHEST-PORTABLE (1 VIEW)   Final Result         Diffuse groundglass opacities throughout both lungs, in keeping with atypical infection      OUTSIDE IMAGES-CT CHEST   Final Result      EC-ECHOCARDIOGRAM LTD W/O CONT    (Results Pending)       ASSESSMENT AND PLAN:     Septic shock (HCC)- (present on admission)  Assessment & Plan  This is Septic shock Present on admission  SIRS criteria identified on my evaluation include: Tachycardia, with heart rate greater than 90 BPM and Leukocytosis, " with WBC greater than 12,000  Source is perforated ulcer  Presentation includes: Severe sepsis present and persistent hypotension after 30 ml/kg completed.   Despite appropriate fluid resuscitation with crystalloid given per sepsis guidelines, the patient remains hypotensive with systolic blood pressure less than 90 or MAP less than 65  Hemodynamic support with additional fluids and IV vasopressors as needed to maintain a SBP of 90 or MAP of 65  IV antibiotics as appropriate for source of sepsis  Reassessment: I have reassessed the patient's hemodynamic status      Perforated ulcer (HCC)- (present on admission)  Assessment & Plan  Perforated ulcer at GJ anastamosis  12/31 Ex lap, repair of ulcer w Valentin patch  [facility]        DISPOSITION: Cont NGT, Cont IV abx and PPI. Wean pressors as tolerated.       ____________________________________     Bonnie Palacios M.D.    DD: 12/31/2020  11:46 AM

## 2020-12-31 NOTE — ANESTHESIA PROCEDURE NOTES
Arterial Line  Performed by: Thais Montejo M.D.  Authorized by: Thais Montejo M.D.     Start Time:  12/30/2020 10:28 PM  End Time:  12/30/2020 10:30 PM  Localization: ultrasound guidance  Image captured, interpreted and electronically stored.  Patient Location:  OR  Indication: continuous blood pressure monitoring    Additional Indication: COVID-19 Active or PUI  COVID-19 Comments: I performed this patient's endotracheal intubation which required substantially increased work beyond that of the typical emergency endotracheal intubation based on the need to use COVID-19 precautions which required increased time and skill employed through use of personal protective equipment in preparing for and during the intubation as well as the additional time spent properly disposing of the equipment upon completion of the procedure.  Catheter Size:  20 G  Seldinger Technique?: Yes    Laterality:  Left  Site:  Radial artery  Line Secured:  Antimicrobial disc, tape and transparent dressing  Events: patient tolerated procedure well with no complications

## 2020-12-31 NOTE — ANESTHESIA PROCEDURE NOTES
Airway    Date/Time: 12/30/2020 10:23 PM  Performed by: Thais Montejo M.D.  Authorized by: Thais Montejo M.D.     Location:  OR  Urgency:  Elective  Difficult Airway: No    Indications for Airway Management:  Anesthesia  Additional Indication: COVID-19 Active or PUI  COVID-19 Comments: I performed this patient's endotracheal intubation which required substantially increased work beyond that of the typical emergency endotracheal intubation based on the need to use COVID-19 precautions which required increased time and skill employed through use of personal protective equipment in preparing for and during the intubation as well as the additional time spent properly disposing of the equipment upon completion of the procedure.  Spontaneous Ventilation: absent    Sedation Level:  Deep  Preoxygenated: Yes    Patient Position:  Sniffing  Mask Difficulty Assessment:  0 - not attempted  Final Airway Type:  Endotracheal airway  Final Endotracheal Airway:  ETT  Cuffed: Yes    Technique Used for Successful ETT Placement:  Video laryngoscopy  Devices/Methods Used in Placement:  Intubating stylet    Insertion Site:  Oral  Blade Type:  Glide  Laryngoscope Blade/Videolaryngoscope Blade Size:  3  ETT Size (mm):  7.0  Measured from:  Lips  ETT to Lips (cm):  22  Placement Verified by: capnometry    Cormack-Lehane Classification:  Grade I - full view of glottis  Number of Attempts at Approach:  1

## 2020-12-31 NOTE — OP REPORT
DATE OF SERVICE:  12/30/2020     PREOPERATIVE DIAGNOSIS:  Pneumoperitoneum.     POSTOPERATIVE DIAGNOSES:  1.  Perforated marginal ulcer of Jf-en-Y gastric bypass.  2.  Liver nodule.     PROCEDURES:  1.  Exploratory laparotomy with modified Valentin patch of perforated marginal   ulcer.  2.  Incisional biopsy of liver nodule.     SURGEON:  Ari Romero MD     ASSISTANT: MARIO Geronimo     ANESTHESIA:  General.     ESTIMATED BLOOD LOSS:  Minimal.     SPECIMENS:  None.     FINDINGS:  A 1 cm perforated marginal ulcer with significant enteric output   into the abdominal cavity contaminating all four quadrants, easily repaired   with modified Valentin patch.     COMPLICATIONS:  None.     DISPOSITION:  The patient tolerated the procedure well and was extubated and   sent to recovery in stable condition.     DESCRIPTION OF PROCEDURE:  Following informed consent, the patient was placed   supine on the operating table and general anesthesia was administered.  The   patient is COVID positive, so COVID precautions were in place.  A Toth   catheter was inserted and the patient was prepped and draped sterile.    Surgical timeout was called.  The patient was on preoperative antibiotics.     We entered the abdomen through a midline laparotomy incision and a   self-retaining retractor was placed.  We found enteric contamination   throughout all four quadrants of the abdomen, which was suctioned clean.  We   ran the small and large intestine and found no gross abnormalities.  We then   examined the gastrojejunostomy.  We found a 1 cm perforated marginal ulcer   that was actively leaking enteric fluid.  We repaired this using a modified   Valentin patch technique with interrupted 2-0 Vicryl sutures across the   ulceration, 2 sutures were required to obliterate the opening, and then   additional sutures were used to tack nearby omentum over the repair.  The   abdomen was then copiously irrigated and suctioned clean.      After thoroughly examining the abdomen, we did find a white nodule on the left   lobe of the liver.  We performed an incisional biopsy of this nodule.  The   overall size of the nodule was about 1.5 cm and we took approximately 0.5 cm   specimen.     Once the abdomen was copiously irrigated and suctioned clean.  We then placed   Seprafilm in the abdomen and we closed the midline fascia with size #1 double   stranded running PDS suture.  The subcutaneous tissues were scrubbed clean and   then the skin was reapproximated with staples and the incision was protected   with a Prevena bandage.     Also of note, we did place an NG tube intraoperatively and we guided it down   manually past the repair for a distance of about 5-6 cm and this was placed on   suction to help decompress the intestine.     All counts were correct.  The patient tolerated the procedure well.  A right   internal jugular vein central venous catheter was placed by the   anesthesiologist and then, the patient was extubated and sent to recovery in   stable condition.     POSTOPERATIVE PLAN:  The patient will be sent to the medical ICU   postoperatively for ongoing resuscitation and she will be n.p.o. for 4 days   after the repair with a NG tube in place and then at that point, we can obtain   a contrast study and interrogate the repair and advance diet from there.  The   patient will be maintained on IV antibiotics after the procedure.        ______________________________  MD BENI Romero/ASHLEY    DD:  12/31/2020 00:48  DT:  12/31/2020 01:30    Job#:  [id number]

## 2020-12-31 NOTE — DISCHARGE PLANNING
Care Transition Team Assessment  Spoke w/ brother Cm [phone] who lives in [place] and verified facesheet info. Pt lives in [place], [place] and lives alone, independent w/ ADL/IADL's. Marital status single. Pt uses a scooter for long distance, no other DME. Brother states pt has a PCP and SSN but not sure of name or SSN.     Information Source  Orientation : Disoriented to Time  Information Given By: Other (Comments)  Informant's Name: Harry Hamman  Who is responsible for making decisions for patient? : Patient         Elopement Risk  Legal Hold: No  Ambulatory or Self Mobile in Wheelchair: No-Not an Elopement Risk    Interdisciplinary Discharge Planning  Lives with - Patient's Self Care Capacity: Alone and Able to Care For Self  Support Systems: Family Member(s)  Able to Return to Previous ADL's: Yes  Mobility Issues: No  Prior Services: Home-Independent  Assistance Needed: Unknown at this Time    Discharge Preparedness  What is your plan after discharge?: Uncertain - pending medical team collaboration  What are your discharge supports?: Sibling  Prior Functional Level: Independent with Activities of Daily Living    Functional Assesment  Prior Functional Level: Independent with Activities of Daily Living    Finances  Financial Barriers to Discharge: No              Advance Directive  Advance Directive?: None    Domestic Abuse  Have you ever been the victim of abuse or violence?: No              Anticipated Discharge Information  Discharge Address: [facility] [address], [place] [address]  Discharge Contact Phone Number: [phone]

## 2020-12-31 NOTE — ANESTHESIA PROCEDURE NOTES
Central Venous Line  Performed by: Thais Montejo M.D.  Authorized by: Thais Montejo M.D.     Start Time:  12/30/2020 11:35 PM  End Time:  12/31/2020 11:45 PM  Patient Location:  OR  Indication: central venous access    Additional Indication: COVID-19 Active or PUI  COVID-19 Comments: I performed this patient's endotracheal intubation which required substantially increased work beyond that of the typical emergency endotracheal intubation based on the need to use COVID-19 precautions which required increased time and skill employed through use of personal protective equipment in preparing for and during the intubation as well as the additional time spent properly disposing of the equipment upon completion of the procedure.    provider hand hygiene performed prior to central venous catheter insertion, all 5 sterile barriers used (gloves, gown, cap, mask, large sterile drape) during central venous catheter insertion and skin prep agent completely dried prior to procedure    Medical Reason for Not Performing Maximal Sterile Barrier Technique: No    Patient Position:  Trendelenburg  Laterality:  Right  Site:  Internal jugular  Prep:  Chlorhexidine  Catheter Size:  7 Fr  Number of Lumens:  Triple lumen  target vein identified, needle advanced into vein and blood aspirated and guidewire advanced into vein    Seldinger Technique?: Yes    Ultrasound-Guided: ultrasound-guided  Image captured, interpreted and electronically stored.  Sterile Gel and Probe Cover Used for Ultrasound?: Yes    Intravenous Verification: verified by ultrasound, venous blood return and chest x-ray pending    all ports aspirated, all ports flushed easily, guidewire was removed intact, biopatch was applied, line was sutured in place and dressing was applied    Events: patient tolerated procedure well with no complications

## 2020-12-31 NOTE — ASSESSMENT & PLAN NOTE
Sliding scale coverage  lantus held given npo status (patient says does not take outpt regimen on mar)

## 2020-12-31 NOTE — OR NURSING
Pt brought to [facility] pressure room 11 in preoperative theater. Pt asleep and not responding to verbal stimuli.  Pt's vitals stable and not on any drips at this time.

## 2020-12-31 NOTE — ASSESSMENT & PLAN NOTE
Perforated ulcer at GJ anastamosis  12/31 Ex lap, repair of ulcer w Valentin patch  [facility]

## 2020-12-31 NOTE — ASSESSMENT & PLAN NOTE
This is Severe Sepsis Present on admission  SIRS criteria identified on my evaluation include: Fever, with temperature greater than 101 deg F, Tachycardia, with heart rate greater than 90 BPM and Leukocytosis, with WBC greater than 12,000  Source of infection is perforated viscous  Clinical indicators of end organ dysfunction include Systolic blood pressure (SBP) <90 mmHg or mean arterial pressure <65 mmHg  Sepsis protocol initiated  Fluid resuscitation ordered per protocol  IV antibiotics as appropriate for source of sepsis  Reassessment: I have reassessed the patient's hemodynamic status  End organ dysfunction include(s):  Acute kidney failure    Zosyn and IVF maint  Limited o2  Once po status start midodrine

## 2020-12-31 NOTE — ANESTHESIA QCDR
2019 [facility] Clinical Data Registry (for Quality Improvement)     Postoperative nausea/vomiting risk protocol (Adult = 18 yrs and Pediatric 3-17 yrs)- (430 and 463)  General inhalation anesthetic (NOT TIVA) with PONV risk factors: Yes  Provision of anti-emetic therapy with at least 2 different classes of agents: Yes   Patient DID NOT receive anti-emetic therapy and reason is documented in Medical Record:  N/A    Multimodal Pain Management- (477)  Non-emergent surgery AND patient age >= 18: No  Use of Multimodal Pain Management, two or more drugs and/or interventions, NOT including systemic opioids:   Exception: Documented allergy to multiple classes of analgesics:     Smoking Abstinence (404)  Patient is current smoker (cigarette, pipe, e-cig, marijuanna): No  Elective Surgery:   Abstinence instructions provided prior to day of surgery:   Patient abstained from smoking on day of surgery:     Pre-Op Beta-Blocker in Isolated CABG (44)  Isolated CABG AND patient age >= 18: No  Beta-blocker admin within 24 hours of surgical incision:   Exception:of medical reason(s) for not administering beta blocker within 24 hours prior to surgical incision (e.g., not  indicated,other medical reason):     PACU assessment of acute postoperative pain prior to Anesthesia Care End- Applies to Patients Age = 18- (ABG7)  Initial PACU pain score is which of the following: < 7/10  Patient unable to report pain score: N/A    Post-anesthetic transfer of care checklist/protocol to PACU/ICU- (426 and 427)  Upon conclusion of case, patient transferred to which of the following locations: PACU/Non-ICU  Use of transfer checklist/protocol: Yes  Exclusion: Service Performed in Patient Hospital Room (and thus did not require transfer): N/A  Unplanned admission to ICU related to anesthesia service up through end of PACU care- (MD51)  Unplanned admission to ICU (not initially anticipated at anesthesia start time): No

## 2020-12-31 NOTE — ED PROVIDER NOTES
ED Provider  Scribed for Jose Mccullough D.O. by Tawanna Pete. 12/30/2020  7:54 PM    Means of arrival: EMS  History obtained from: Patient   History limited by: None    CHIEF COMPLAINT  Chief Complaint   Patient presents with   • Abdominal Pain     esophageal tear per outlying facility     PPE Note: I personally donned full PPE for all patient encounters during this visit, including wearing an N95 respirator mask. Scribe remained outside the patient's room and did not have any contact with the patient for the duration of patient encounter.      HPI  Maria Hamman is a 50 y.o. female who presents as a transfer patient from the [facility] ER on [facility] with severe lower abdominal pain onset this morning. They diagnosed her with an esophageal tear and COVID. She also endorses shoulder pain, vomiting, body aches, dyspnea, and cough. Her shoulder pain is currently a 7/10 in severity and is exacerbated with movement which is improved from this morning. Her nausea  has also resolved. She had a fever of 103 °F fever this morning. She is a daily marijuana smoker. She states she has a cough from that. Her cough this morning was much worse than her baseline.  Per EMS, she received 50 micrograms of fentanyl and is on on levo at 10 mcg/min. She is a Type II Diabetic with a history of gastric bypass. She is currently taking medications for her diabetes and thyroid as well as Lyrica and Celebrex.    REVIEW OF SYSTEMS  See HPI for further details. All other systems are negative.     PAST MEDICAL HISTORY   Type II diabetes, thyroid issues, pain management.    SOCIAL HISTORY  Social History     Tobacco Use   • Smoking status: None noted   Substance and Sexual Activity   • Alcohol use: None noted   • Drug use: None noted   • Sexual activity: None noted     SURGICAL HISTORY  patient denies any surgical history    CURRENT MEDICATIONS  No current outpatient medications    ALLERGIES  Not on File    PHYSICAL EXAM  VITAL SIGNS:  "BP (!) 92/50   Pulse (!) 103   Resp (!) 29   Ht 1.702 m (5' 7\")   Wt (!) 172.4 kg (380 lb)   SpO2 97%   BMI 59.52 kg/m²   Constitutional: Awake alert, no acute distress. Morbidly obese.  HENT: No signs of trauma, patient wearing mask.  Eyes: Conjunctiva normal, Non-icteric.   Neck: Normal range of motion, No tenderness, Supple.  Lymphatic: No lymphadenopathy noted.   Cardiovascular: Regular rate and rhythm, no murmurs.   Thorax & Lungs: Normal breath sounds, No respiratory distress, No wheezing, No chest tenderness.   Abdomen: Significantly obese making exam difficult. Bowel sounds normal, Soft, No tenderness, No masses, No pulsatile masses. No peritoneal signs.  Skin: Warm, Dry, normal color.   Back: No bony tenderness, No CVA tenderness.   Extremities: No edema, No tenderness, No cyanosis  Musculoskeletal: Good range of motion in all major joints. No tenderness to palpation or major deformities noted.   Neurologic: Alert and oriented x4, Normal motor function, Normal sensory function, No focal deficits noted.   Psychiatric: Affect normal, Judgment normal, Mood normal.     DIAGNOSTIC STUDIES / PROCEDURES    LABS  Results for orders placed or performed during the hospital encounter of 12/30/20   LACTIC ACID   Result Value Ref Range    Lactic Acid 2.2 (H) 0.5 - 2.0 mmol/L   CBC WITH DIFFERENTIAL   Result Value Ref Range    WBC 12.7 (H) 4.8 - 10.8 K/uL    RBC 4.52 4.20 - 5.40 M/uL    Hemoglobin 13.0 12.0 - 16.0 g/dL    Hematocrit 43.5 37.0 - 47.0 %    MCV 96.2 81.4 - 97.8 fL    MCH 28.8 27.0 - 33.0 pg    MCHC 29.9 (L) 33.6 - 35.0 g/dL    RDW 54.1 (H) 35.9 - 50.0 fL    Platelet Count 253 164 - 446 K/uL    MPV 10.9 9.0 - 12.9 fL    Neutrophils-Polys 88.20 (H) 44.00 - 72.00 %    Lymphocytes 7.90 (L) 22.00 - 41.00 %    Monocytes 3.40 0.00 - 13.40 %    Eosinophils 0.00 0.00 - 6.90 %    Basophils 0.20 0.00 - 1.80 %    Immature Granulocytes 0.30 0.00 - 0.90 %    Nucleated RBC 0.00 /100 WBC    Neutrophils (Absolute) " 11.21 (H) 2.00 - 7.15 K/uL    Lymphs (Absolute) 1.01 1.00 - 4.80 K/uL    Monos (Absolute) 0.43 0.00 - 0.85 K/uL    Eos (Absolute) 0.00 0.00 - 0.51 K/uL    Baso (Absolute) 0.03 0.00 - 0.12 K/uL    Immature Granulocytes (abs) 0.04 0.00 - 0.11 K/uL    NRBC (Absolute) 0.00 K/uL    Anisocytosis 1+    COMP METABOLIC PANEL   Result Value Ref Range    Sodium 140 135 - 145 mmol/L    Potassium 5.4 3.6 - 5.5 mmol/L    Chloride 112 96 - 112 mmol/L    Co2 16 (L) 20 - 33 mmol/L    Anion Gap 12.0 7.0 - 16.0    Glucose 186 (H) 65 - 99 mg/dL    Bun 27 (H) 8 - 22 mg/dL    Creatinine 2.08 (H) 0.50 - 1.40 mg/dL    Calcium 8.1 (L) 8.5 - 10.5 mg/dL    AST(SGOT) 23 12 - 45 U/L    ALT(SGPT) 21 2 - 50 U/L    Alkaline Phosphatase 85 30 - 99 U/L    Total Bilirubin <0.2 0.1 - 1.5 mg/dL    Albumin 3.4 3.2 - 4.9 g/dL    Total Protein 6.7 6.0 - 8.2 g/dL    Globulin 3.3 1.9 - 3.5 g/dL    A-G Ratio 1.0 g/dL   TROPONIN   Result Value Ref Range    Troponin T 16 6 - 19 ng/L   ESTIMATED GFR   Result Value Ref Range    GFR If African American 30 (A) >60 mL/min/1.73 m 2    GFR If Non African American 25 (A) >60 mL/min/1.73 m 2   PERIPHERAL SMEAR REVIEW   Result Value Ref Range    Peripheral Smear Review see below    PLATELET ESTIMATE   Result Value Ref Range    Plt Estimation Normal    MORPHOLOGY   Result Value Ref Range    RBC Morphology Present     Polychromia 1+     Poikilocytosis 1+     Ovalocytes 1+    DIFFERENTIAL COMMENT   Result Value Ref Range    Comments-Diff see below    EKG   Result Value Ref Range    Report       [facility] Emergency Dept.    Test Date:  2020  Pt Name:    MARIA HAMMAN                 Department: ER  MRN:        [id number]                      Room:       RD 08  Gender:     Female                       Technician: [id number]  :        [id number]                   Requested By:CHARLOTTE HARDY  Order #:    [id number]                    Valerie BENTON:    Measurements  Intervals                                 Axis  Rate:       100                          P:          52  MS:         152                          QRS:        46  QRSD:       80                           T:          37  QT:         332  QTc:        429    Interpretive Statements  SINUS TACHYCARDIA  BASELINE WANDER IN LEAD(S) II  No previous ECG available for comparison       All labs reviewed by me.    COURSE  Pertinent Labs & Imaging studies reviewed. (See chart for details)    Review of past medical records from [facility] show free air under the diaphragm giving concerns for  Boerhaave's syndrome.     7:54 PM - Patient seen and examined at bedside. Discussed plan of care. Ordered for Lactic Acid, Troponin, CBC with diff, CMP, Urinalysis, Urine Culture, Blood Culture and EKG to evaluate her symptoms.     8:00 PM - Levophed is diffusing in a right femoral central line at 10 mcg/min. Systolic blood pressure is 92 with a heart rate of 105. She is Awake and alert. Her pain and nausea are controlled. She was given 1g of rocephin. Call is pending from cardiothoracic surgery.     8:06 PM - Paged Cardiology    8:26 PM - Re-Paged Cardiology    8:33 PM -  I discussed the patient's case and the above findings with Dr. Gentile (Cardiology) who informed me that [facility] does this type of surgery.     8:38 PM - Paged Surgery     8:45 PM - I discussed the patient's case and the above findings with Dr. Romero (Surgery) who agrees to come and evaluate the patient.      10:18 PM - Dr. Romero will talk to the hospitalist.     MEDICAL DECISION MAKING  This is a 50 y.o. female who presents as a transfer from a outlying facility for concerns of sepsis and perforated viscus.  There is free air under the diaphragm on the CT.  The etiology is concerning for bore heaves syndrome.  The patient presented with Levophed.  Her blood pressure has been labile but staying above 90 with Levophed at 10 mics per minute    Records reviewed shows that she has been started on  Isabel.  I spoke with Dr. Gabriele womack the general surgeon and that he came in to evaluate the patient brought the patient to the operating room.  I spoke with intensivist for admission.  The patient is positive for Covid but her primary diagnosis this is not related to Covid.      DISPOSITION:  Patient will be hospitalized by intensivist in critical condition.      FINAL IMPRESSION  1. Sepsis without acute organ dysfunction, due to unspecified organism (HCC)    2. Secondary hypertension    3. Septic shock (HCC)    4. Perforated viscus    Coronavirus infection    CRITICAL CARE  The very real possibilty of a deterioration of this patient's condition required the highest level of my preparedness for sudden, emergent intervention.  I provided critical care services, which included medication orders, frequent reevaluations of the patient's condition and response to treatment, ordering and reviewing test results, and discussing the case with various consultants.  The critical care time associated with the care of the patient was 30 minutes. Review chart for interventions. This time is exclusive of any other billable procedures.      Tawanna MCCARTHY (Peggy), am scribing for, and in the presence of, Jose Mccullough D.O..    Electronically signed by: Tawanna Pete (Peggy), 12/30/2020    Jose MCCARTHY D.O. personally performed the services described in this documentation, as scribed by Tawanna Pete in my presence, and it is both accurate and complete.    The note accurately reflects work and decisions made by me.  Jose Mccullough D.O.  12/30/2020  11:27 PM

## 2020-12-31 NOTE — ANESTHESIA PREPROCEDURE EVALUATION
50yoF with hx of gastric bypass, morbid obesity BMI > 59  DMII, hypothyroid   with esophageal tear    NKDA  Daily marijuana use    Currently on levophed, through femoral central line    Relevant Problems   No relevant active problems       Physical Exam    Airway   Mallampati: III       Cardiovascular   Rate: abnormal     Dental    Pulmonary   (+) decreased breath sounds     Abdominal   (+) obese     Neurological              Anesthesia Plan    ASA 4- EMERGENT   ASA physical status 4 criteria: other (comment)ASA physical status emergent criteria: acute peritonitis    Plan - general       Airway plan will be ETT        Induction: intravenous and rapid sequence    Postoperative Plan: Postoperative administration of opioids is intended.    Pertinent diagnostic labs and testing reviewed    Informed Consent:    Anesthetic plan and risks discussed with patient.

## 2020-12-31 NOTE — PROGRESS NOTES
Critical Care Progress Note    Date of admission  12/30/2020    Chief Complaint  50 y.o. female who presented 12/30/2020 with Hx of gastric bypass 1990's, DM 2, hypothyroidism, obesity that presented to free standing ER for severe abdominal pain that occurred 12/30 early am. She has also had fever to 103. THC use. She had a ct scan that showed a perforated viscouos and was transferred and taken emergently to OR and found to have perforated viscous from a marginal ulcer s/p modified gram patch by Dr Aquino. She was in shock on norepinephrine gtt and was extubated at the end of case and transferred to ICU. She was also Covid +.     Hospital Course  No notes on file    Interval Problem Update  Reviewed last 24 hour events:  On 6L o2  S/p perforation, post operative  Renal function acute kindney injury, cr 2.08, lactic acid improved to normal  ua negative    Review of Systems  Review of Systems   Unable to perform ROS: Critical illness        Vital Signs for last 24 hours   Temp:  [36.6 °C (97.8 °F)-37.8 °C (100 °F)] 37.8 °C (100 °F)  Pulse:  [] 90  Resp:  [17-32] 18  BP: ()/(37-59) 83/41  SpO2:  [90 %-100 %] 94 %    Hemodynamic parameters for last 24 hours       Respiratory Information for the last 24 hours       Physical Exam   Physical Exam  Vitals signs and nursing note reviewed.   Constitutional:       General: She is not in acute distress.     Appearance: She is ill-appearing. She is not toxic-appearing or diaphoretic.   HENT:      Head: Normocephalic and atraumatic.      Right Ear: External ear normal.      Left Ear: External ear normal.      Nose: No congestion or rhinorrhea.      Mouth/Throat:      Pharynx: Oropharynx is clear. No oropharyngeal exudate or posterior oropharyngeal erythema.   Eyes:      General: No scleral icterus.     Extraocular Movements: Extraocular movements intact.      Conjunctiva/sclera: Conjunctivae normal.      Pupils: Pupils are equal, round, and reactive to light.    Neck:      Musculoskeletal: Neck supple. No neck rigidity or muscular tenderness.   Cardiovascular:      Rate and Rhythm: Regular rhythm. Tachycardia present.      Pulses: Normal pulses.      Heart sounds: Normal heart sounds. No murmur.   Pulmonary:      Effort: Respiratory distress present.      Breath sounds: No wheezing.   Abdominal:      General: There is no distension.      Palpations: There is no mass.      Tenderness: There is no abdominal tenderness. There is no guarding.   Musculoskeletal:         General: No swelling or tenderness.      Right lower leg: No edema.      Left lower leg: No edema.   Lymphadenopathy:      Cervical: No cervical adenopathy.   Skin:     Coloration: Skin is not jaundiced or pale.      Findings: No bruising, erythema, lesion or rash.   Neurological:      General: No focal deficit present.      Mental Status: She is alert and oriented to person, place, and time.      Cranial Nerves: No cranial nerve deficit.      Sensory: No sensory deficit.      Motor: No weakness.      Coordination: Coordination normal.      Deep Tendon Reflexes: Reflexes normal.   Psychiatric:         Mood and Affect: Mood normal.         Behavior: Behavior normal.         Medications  Current Facility-Administered Medications   Medication Dose Route Frequency Provider Last Rate Last Admin   • norepinephrine (Levophed) infusion 8 mg/250 mL (premix)  0-30 mcg/min Intravenous Continuous Ricardo Verdugo M.D. 26.3 mL/hr at 12/31/20 0645 14 mcg/min at 12/31/20 0645   • RACEPINEPHRINE HCL 2.25 % INH NEBU            • piperacillin-tazobactam (ZOSYN) 4.5 g in  mL IVPB  4.5 g Intravenous Q8HRS Ricardo Verdugo M.D. 25 mL/hr at 12/31/20 0623 4.5 g at 12/31/20 0623   • Respiratory Therapy Consult   Nebulization Continuous RT Ricardo Verdugo M.D.       • ipratropium-albuterol (DUONEB) nebulizer solution  3 mL Nebulization Q2HRS PRN (RT) Ricardo Verdugo M.D.       • senna-docusate (PERICOLACE or SENOKOT S)  8.6-50 MG per tablet 2 Tab  2 Tab Enteral Tube BID Ricardo Verdugo M.D.   Stopped at 12/31/20 0600    And   • polyethylene glycol/lytes (MIRALAX) PACKET 1 Packet  1 Packet Enteral Tube QDAY PRN Ricardo Verdugo M.D.        And   • magnesium hydroxide (MILK OF MAGNESIA) suspension 30 mL  30 mL Enteral Tube QDAY PRN Ricardo Verdugo M.D.        And   • bisacodyl (DULCOLAX) suppository 10 mg  10 mg Rectal QDAY PRN Ricardo Verdugo M.D.       • MD Alert...ICU Electrolyte Replacement per Pharmacy   Other PHARMACY TO DOSE Ricardo Verdugo M.D.       • fentaNYL (SUBLIMAZE) injection 25-50 mcg  25-50 mcg Intravenous Q HOUR PRN Ricardo Verdugo M.D.   50 mcg at 12/31/20 0305   • lactated ringers infusion  2,000 mL Intravenous Continuous Ricardo Verdugo M.D. 83 mL/hr at 12/31/20 0402 1,000 mL at 12/31/20 0402   • insulin glargine (Lantus) injection  0.2 Units/kg/day Subcutaneous Q EVENING Ricardo Verdugo M.D.        And   • insulin lispro (HumaLOG) injection  2-9 Units Subcutaneous Q6HRS Ricardo Verdugo M.D.   3 Units at 12/31/20 0629    And   • glucose 4 g chewable tablet 16 g  16 g Oral Q15 MIN PRN Ricardo Verdugo M.D.        And   • dextrose 50% (D50W) injection 50 mL  50 mL Intravenous Q15 MIN PRN Ricardo Verdugo M.D.           Fluids    Intake/Output Summary (Last 24 hours) at 12/31/2020 0735  Last data filed at 12/31/2020 0645  Gross per 24 hour   Intake 4262.8 ml   Output 1185 ml   Net 3077.8 ml       Laboratory      Recent Labs     12/31/20  0330   CPKTOTAL 59     Recent Labs     12/30/20 2002   SODIUM 140   POTASSIUM 5.4   CHLORIDE 112   CO2 16*   BUN 27*   CREATININE 2.08*   CALCIUM 8.1*     Recent Labs     12/30/20 2002   ALTSGPT 21   ASTSGOT 23   ALKPHOSPHAT 85   TBILIRUBIN <0.2   GLUCOSE 186*     Recent Labs     12/30/20 2002   WBC 12.7*   NEUTSPOLYS 88.20*   LYMPHOCYTES 7.90*   MONOCYTES 3.40   EOSINOPHILS 0.00   BASOPHILS 0.20   ASTSGOT 23   ALTSGPT 21   ALKPHOSPHAT 85   TBILIRUBIN <0.2      Recent Labs     12/30/20 2002   RBC 4.52   HEMOGLOBIN 13.0   HEMATOCRIT 43.5   PLATELETCT 253       Imaging  X-Ray:  I have personally reviewed the images and compared with prior images.  EKG:  I have personally reviewed the images and compared with prior images.  CT:    Reviewed    Assessment/Plan  Stridor  Assessment & Plan  Post operative stridor  S/p dexemethason and racemic epinephrine  Monitor need for emergent intubation    DM (diabetes mellitus) ([facility])  Assessment & Plan  Sliding scale coverage    Septic shock ([facility])  Assessment & Plan  This is Severe Sepsis Present on admission  SIRS criteria identified on my evaluation include: Fever, with temperature greater than 101 deg F, Tachycardia, with heart rate greater than 90 BPM and Leukocytosis, with WBC greater than 12,000  Source of infection is perforated viscous  Clinical indicators of end organ dysfunction include Systolic blood pressure (SBP) <90 mmHg or mean arterial pressure <65 mmHg  Sepsis protocol initiated  Fluid resuscitation ordered per protocol  IV antibiotics as appropriate for source of sepsis  Reassessment: I have reassessed the patient's hemodynamic status  End organ dysfunction include(s):  Acute kidney failure    Zosyn and IVF serial monitor lactate    LAN (acute kidney injury) ([facility])  Assessment & Plan  Fluid resus  Serial monitor renal function  Avoid nephrotoxins  cpk    Class 3 severe obesity in adult ([facility])  Assessment & Plan  Weight reduction and behavior modification  Mobilize   IS   High risk post exlap for atlectasis and need for mechanical ventilation  Limit sedatives/Narcotics    COVID-19  Assessment & Plan  +Covid on intial CT no disease seen  Severe obesity high risk for worsening disease state  Maintain euvolemia  Hold on dexamethason with marginal ulcer and poor wound healing in the obese female daily re-access need  Consider RDV if renal function improves      Perforated ulcer ([facility])  Assessment & Plan  Perforated marginal  ulcer s/p modified chanelle patch by Dr Aquino 12/31  NG tube x3-4 days  NPO  Zosyn for 3 days  Serial monitor abdominal exam  Fluid resus         VTE:  Contraindicated  Ulcer: Not Indicated  Lines: Central Line  Ongoing indication addressed and Toth Catheter  Ongoing indication addressed    I have performed a physical exam and reviewed and updated ROS and Plan today (12/31/2020). In review of yesterday's note (12/30/2020), there are no changes except as documented above.     Discussed patient condition and risk of morbidity and/or mortality with RN, RT, Code status disscussed, Charge nurse / hot rounds, Patient and general surgery     The patient remains critically ill.  Levophed for map > 65.  Critical care time = 36 minutes in directly providing and coordinating critical care and extensive data review.  No time overlap and excludes procedures.

## 2020-12-31 NOTE — ANESTHESIA TIME REPORT
Anesthesia Start and Stop Event Times     Date Time Event    12/30/2020 2200 Ready for Procedure     2209 Anesthesia Start    12/31/2020 0008 Anesthesia Stop        Responsible Staff  12/30/20 to 12/31/20    Name Role Begin End    Thais Montejo M.D. Anesth 2209 0008        Preop Diagnosis (Free Text):  Pre-op Diagnosis     perforated ulcer        Preop Diagnosis (Codes):    Post op Diagnosis  Perforated ulcer (HCC)      Premium Reason  B. 1st Call    Comments:

## 2020-12-31 NOTE — ASSESSMENT & PLAN NOTE
This is Septic shock Present on admission  SIRS criteria identified on my evaluation include: Tachycardia, with heart rate greater than 90 BPM and Leukocytosis, with WBC greater than 12,000  Source is perforated ulcer  Presentation includes: Severe sepsis present and persistent hypotension after 30 ml/kg completed.   Despite appropriate fluid resuscitation with crystalloid given per sepsis guidelines, the patient remains hypotensive with systolic blood pressure less than 90 or MAP less than 65  Hemodynamic support with additional fluids and IV vasopressors as needed to maintain a SBP of 90 or MAP of 65  IV antibiotics as appropriate for source of sepsis  Reassessment: I have reassessed the patient's hemodynamic status

## 2020-12-31 NOTE — ED TRIAGE NOTES
Pt had a coughing fit around noon and then states she had severe abdominal pain. She went to the ER on [address] and was found to have an esophageal tear. She arrives at [facility] on levo at 10 mcg/min. Pt states her pain has improved significantly since this afternoon.     .  Vitals:    12/30/20 2000   BP: (!) 92/50   Pulse: (!) 103   Resp: (!) 29   SpO2: 97%     .  Chief Complaint   Patient presents with   • Abdominal Pain     esophageal tear per outlying facility

## 2020-12-31 NOTE — CONSULTS
Critical Care Consultation    Date of consult: 12/30/2020    Referring Physician  Ari Romero M.D.    Reason for Consultation  Septic shock    History of Presenting Illness  50 y.o. female who presented 12/30/2020 with Hx of gastric bypass 1990's, DM 2, hypothyroidism, obesity that presented to free standing ER for severe abdominal pain that occurred 12/30 early am. She has also had fever to 103. THC use. She had a ct scan that showed a perforated viscouos and was transferred and taken emergently to OR and found to have perforated viscous from a marginal ulcer s/p modified gram patch by Dr Aquino. She was in shock on norepinephrine gtt and was extubated at the end of case and transferred to ICU. She was also Covid +.     Code Status  No Order    Review of Systems  Review of Systems   Unable to perform ROS: Acuity of condition       Past Medical History   has no past medical history on file.    Surgical History   has no past surgical history on file.    Family History  family history is not on file.    Social History       Medications  Home Medications    **Home medications have not yet been reviewed for this encounter**       Current Facility-Administered Medications   Medication Dose Route Frequency Provider Last Rate Last Admin   • norepinephrine (Levophed) infusion 8 mg/250 mL (premix)  0-30 mcg/min Intravenous Continuous Ricardo Verdugo M.D. 7.5 mL/hr at 12/31/20 0152 4 mcg/min at 12/31/20 0152   • RACEPINEPHRINE HCL 2.25 % INH NEBU            • piperacillin-tazobactam (ZOSYN) 4.5 g in  mL IVPB  4.5 g Intravenous Once Ricardo Verdugo M.D.        And   • piperacillin-tazobactam (ZOSYN) 4.5 g in  mL IVPB  4.5 g Intravenous Q8HRS Ricardo Verdugo M.D.       • Respiratory Therapy Consult   Nebulization Continuous RT Ricardo Verdugo M.D.       • ipratropium-albuterol (DUONEB) nebulizer solution  3 mL Nebulization Q2HRS PRN (RT) Ricardo Verdugo M.D.       • senna-docusate (PERICOLACE or  SENOKOT S) 8.6-50 MG per tablet 2 Tab  2 Tab Enteral Tube BID Ricardo Verdugo M.D.        And   • polyethylene glycol/lytes (MIRALAX) PACKET 1 Packet  1 Packet Enteral Tube QDAY PRN Ricardo Verdugo M.D.        And   • magnesium hydroxide (MILK OF MAGNESIA) suspension 30 mL  30 mL Enteral Tube QDAY PRN Ricardo Verdugo M.D.        And   • bisacodyl (DULCOLAX) suppository 10 mg  10 mg Rectal QDAY PRN Ricardo Verdugo M.D.       • MD Alert...ICU Electrolyte Replacement per Pharmacy   Other PHARMACY TO DOSE Ricardo Verdugo M.D.       • fentaNYL (SUBLIMAZE) injection 25-50 mcg  25-50 mcg Intravenous Q HOUR PRN Ricardo Verdugo M.D.       • lactated ringers infusion  2,000 mL Intravenous Continuous Ricardo Verdugo M.D.       • insulin glargine (Lantus) injection  0.2 Units/kg/day Subcutaneous Q EVENING Ricardo Verdugo M.D.        And   • insulin lispro (HumaLOG) injection  2-9 Units Subcutaneous Q6HRS Ricardo Verdugo M.D.        And   • glucose 4 g chewable tablet 16 g  16 g Oral Q15 MIN PRN Ricardo Verdugo M.D.        And   • dextrose 50% (D50W) injection 50 mL  50 mL Intravenous Q15 MIN PRN Ricardo Verdugo M.D.           Allergies  Not on File    Vital Signs last 24 hours  Temp:  [36.6 °C (97.8 °F)-37.8 °C (100 °F)] 37.8 °C (100 °F)  Pulse:  [] 107  Resp:  [17-31] 25  BP: ()/(37-59) 107/53  SpO2:  [90 %-100 %] 97 %    Physical Exam  Physical Exam  Vitals signs and nursing note reviewed.   Constitutional:       General: She is not in acute distress.     Appearance: She is obese. She is ill-appearing and diaphoretic. She is not toxic-appearing.      Comments: Severely obese female in bariatric bed in ICU   HENT:      Head: Normocephalic.      Nose: Nose normal.      Comments: NG tube in place     Mouth/Throat:      Mouth: Mucous membranes are moist.   Eyes:      Pupils: Pupils are equal, round, and reactive to light.   Neck:      Comments: Large obese neck with right IJ central  line  Cardiovascular:      Rate and Rhythm: Normal rate.      Heart sounds: No murmur.   Pulmonary:      Effort: No respiratory distress.      Breath sounds: No stridor. No wheezing or rhonchi.   Abdominal:      General: There is distension.      Palpations: There is no mass.      Tenderness: There is no abdominal tenderness. There is no guarding or rebound.      Hernia: No hernia is present.      Comments: Surgical bandage midline   Genitourinary:     Rectum: Guaiac result negative.   Musculoskeletal:         General: No swelling or tenderness.   Skin:     Coloration: Skin is pale. Skin is not jaundiced.      Findings: No bruising or erythema.   Neurological:      Mental Status: She is alert and oriented to person, place, and time.      Cranial Nerves: No cranial nerve deficit.      Sensory: No sensory deficit.      Motor: No weakness.      Coordination: Coordination normal.      Comments: Still sleepy post anesthesia   Psychiatric:         Mood and Affect: Mood normal.         Fluids    Intake/Output Summary (Last 24 hours) at 12/31/2020 0225  Last data filed at 12/31/2020 0054  Gross per 24 hour   Intake 3900 ml   Output 85 ml   Net 3815 ml       Laboratory  Recent Results (from the past 48 hour(s))   LACTIC ACID    Collection Time: 12/30/20  8:02 PM   Result Value Ref Range    Lactic Acid 2.2 (H) 0.5 - 2.0 mmol/L   CBC WITH DIFFERENTIAL    Collection Time: 12/30/20  8:02 PM   Result Value Ref Range    WBC 12.7 (H) 4.8 - 10.8 K/uL    RBC 4.52 4.20 - 5.40 M/uL    Hemoglobin 13.0 12.0 - 16.0 g/dL    Hematocrit 43.5 37.0 - 47.0 %    MCV 96.2 81.4 - 97.8 fL    MCH 28.8 27.0 - 33.0 pg    MCHC 29.9 (L) 33.6 - 35.0 g/dL    RDW 54.1 (H) 35.9 - 50.0 fL    Platelet Count 253 164 - 446 K/uL    MPV 10.9 9.0 - 12.9 fL    Neutrophils-Polys 88.20 (H) 44.00 - 72.00 %    Lymphocytes 7.90 (L) 22.00 - 41.00 %    Monocytes 3.40 0.00 - 13.40 %    Eosinophils 0.00 0.00 - 6.90 %    Basophils 0.20 0.00 - 1.80 %    Immature Granulocytes  0.30 0.00 - 0.90 %    Nucleated RBC 0.00 /100 WBC    Neutrophils (Absolute) 11.21 (H) 2.00 - 7.15 K/uL    Lymphs (Absolute) 1.01 1.00 - 4.80 K/uL    Monos (Absolute) 0.43 0.00 - 0.85 K/uL    Eos (Absolute) 0.00 0.00 - 0.51 K/uL    Baso (Absolute) 0.03 0.00 - 0.12 K/uL    Immature Granulocytes (abs) 0.04 0.00 - 0.11 K/uL    NRBC (Absolute) 0.00 K/uL    Anisocytosis 1+    COMP METABOLIC PANEL    Collection Time: 12/30/20  8:02 PM   Result Value Ref Range    Sodium 140 135 - 145 mmol/L    Potassium 5.4 3.6 - 5.5 mmol/L    Chloride 112 96 - 112 mmol/L    Co2 16 (L) 20 - 33 mmol/L    Anion Gap 12.0 7.0 - 16.0    Glucose 186 (H) 65 - 99 mg/dL    Bun 27 (H) 8 - 22 mg/dL    Creatinine 2.08 (H) 0.50 - 1.40 mg/dL    Calcium 8.1 (L) 8.5 - 10.5 mg/dL    AST(SGOT) 23 12 - 45 U/L    ALT(SGPT) 21 2 - 50 U/L    Alkaline Phosphatase 85 30 - 99 U/L    Total Bilirubin <0.2 0.1 - 1.5 mg/dL    Albumin 3.4 3.2 - 4.9 g/dL    Total Protein 6.7 6.0 - 8.2 g/dL    Globulin 3.3 1.9 - 3.5 g/dL    A-G Ratio 1.0 g/dL   TROPONIN    Collection Time: 12/30/20  8:02 PM   Result Value Ref Range    Troponin T 16 6 - 19 ng/L   ESTIMATED GFR    Collection Time: 12/30/20  8:02 PM   Result Value Ref Range    GFR If African American 30 (A) >60 mL/min/1.73 m 2    GFR If Non African American 25 (A) >60 mL/min/1.73 m 2   PERIPHERAL SMEAR REVIEW    Collection Time: 12/30/20  8:02 PM   Result Value Ref Range    Peripheral Smear Review see below    PLATELET ESTIMATE    Collection Time: 12/30/20  8:02 PM   Result Value Ref Range    Plt Estimation Normal    MORPHOLOGY    Collection Time: 12/30/20  8:02 PM   Result Value Ref Range    RBC Morphology Present     Polychromia 1+     Poikilocytosis 1+     Ovalocytes 1+    DIFFERENTIAL COMMENT    Collection Time: 12/30/20  8:02 PM   Result Value Ref Range    Comments-Diff see below    EKG    Collection Time: 12/30/20  9:14 PM   Result Value Ref Range    Report       [facility]    Test  Date:  2020  Pt Name:    MARIA HAMMAN                 Department: ER  MRN:        [id number]                      Room:       RD 08  Gender:     Female                       Technician: [id number]  :        1970                   Requested By:CHARLOTTE HRADY  Order #:    [id number]                    Reading MD:    Measurements  Intervals                                Axis  Rate:       100                          P:          52  WY:         152                          QRS:        46  QRSD:       80                           T:          37  QT:         332  QTc:        429    Interpretive Statements  SINUS TACHYCARDIA  BASELINE WANDER IN LEAD(S) II  No previous ECG available for comparison         Imaging  DX-CHEST-PORTABLE (1 VIEW)   Final Result         Diffuse groundglass opacities throughout both lungs, in keeping with atypical infection      OUTSIDE IMAGES-CT CHEST   Final Result      EC-ECHOCARDIOGRAM COMPLETE W/O CONT    (Results Pending)       Assessment/Plan  Stridor  Assessment & Plan  Post operative stridor  S/p dexemethason and racemic epinephrine  Monitor need for emergent intubation    DM (diabetes mellitus) ([facility])  Assessment & Plan  Sliding scale coverage    Septic shock ([facility])  Assessment & Plan  This is Severe Sepsis Present on admission  SIRS criteria identified on my evaluation include: Fever, with temperature greater than 101 deg F, Tachycardia, with heart rate greater than 90 BPM and Leukocytosis, with WBC greater than 12,000  Source of infection is perforated viscous  Clinical indicators of end organ dysfunction include Systolic blood pressure (SBP) <90 mmHg or mean arterial pressure <65 mmHg  Sepsis protocol initiated  Fluid resuscitation ordered per protocol  IV antibiotics as appropriate for source of sepsis  Reassessment: I have reassessed the patient's hemodynamic status  End organ dysfunction include(s):  Acute kidney failure    Zosyn and IVF serial monitor lactate    LAN (acute  kidney injury) ([facility])  Assessment & Plan  Fluid resus  Serial monitor renal function  Avoid nephrotoxins  cpk    Class 3 severe obesity in adult ([facility])  Assessment & Plan  Weight reduction and behavior modification  Mobilize   IS   High risk post exlap for atlectasis and need for mechanical ventilation  Limit sedatives/Narcotics    COVID-19  Assessment & Plan  +Covid on intial CT no disease seen  Severe obesity high risk for worsening disease state  Maintain euvolemia  Hold on dexamethason with marginal ulcer and poor wound healing in the obese female daily re-access need  Consider RDV if renal function improves      Perforated ulcer ([facility])  Assessment & Plan  Perforated marginal ulcer s/p modified chanelle patch by Dr Aquino 12/31  NG tube x3-4 days  NPO  Zosyn for 3 days  Serial monitor abdominal exam  Fluid resus      Discussed patient condition and risk of morbidity and/or mortality with RN, RT, Pharmacy, Charge nurse / hot rounds, Patient and general surgery.      The patient remains critically ill with septic shock and kevin on norepinephrine gtt with active titration.  Critical care time = 78 minutes in directly providing and coordinating critical care and extensive data review.  No time overlap and excludes procedures.

## 2020-12-31 NOTE — ASSESSMENT & PLAN NOTE
Weight reduction and behavior modification  Mobilize   IS   High risk post exlap for atlectasis and need for mechanical ventilation  Limit sedatives/Narcotics

## 2021-01-01 LAB
ACTION RANGE TRIGGERED IACRT: YES
ALBUMIN SERPL BCP-MCNC: 2.9 G/DL (ref 3.2–4.9)
ALBUMIN/GLOB SERPL: 0.9 G/DL
ALP SERPL-CCNC: 66 U/L (ref 30–99)
ALT SERPL-CCNC: 27 U/L (ref 2–50)
ANION GAP SERPL CALC-SCNC: 6 MMOL/L (ref 7–16)
AST SERPL-CCNC: 23 U/L (ref 12–45)
BASE EXCESS BLDA CALC-SCNC: -8 MMOL/L (ref -4–3)
BASOPHILS # BLD AUTO: 0.2 % (ref 0–1.8)
BASOPHILS # BLD: 0.02 K/UL (ref 0–0.12)
BILIRUB SERPL-MCNC: <0.2 MG/DL (ref 0.1–1.5)
BODY TEMPERATURE: ABNORMAL DEGREES
BUN SERPL-MCNC: 23 MG/DL (ref 8–22)
CALCIUM SERPL-MCNC: 7.8 MG/DL (ref 8.5–10.5)
CHLORIDE SERPL-SCNC: 113 MMOL/L (ref 96–112)
CO2 BLDA-SCNC: 20 MMOL/L (ref 20–33)
CO2 SERPL-SCNC: 21 MMOL/L (ref 20–33)
CREAT SERPL-MCNC: 1.3 MG/DL (ref 0.5–1.4)
EOSINOPHIL # BLD AUTO: 0 K/UL (ref 0–0.51)
EOSINOPHIL NFR BLD: 0 % (ref 0–6.9)
ERYTHROCYTE [DISTWIDTH] IN BLOOD BY AUTOMATED COUNT: 55.4 FL (ref 35.9–50)
GLOBULIN SER CALC-MCNC: 3.1 G/DL (ref 1.9–3.5)
GLUCOSE BLD-MCNC: 128 MG/DL (ref 65–99)
GLUCOSE BLD-MCNC: 132 MG/DL (ref 65–99)
GLUCOSE BLD-MCNC: 157 MG/DL (ref 65–99)
GLUCOSE BLD-MCNC: 166 MG/DL (ref 65–99)
GLUCOSE SERPL-MCNC: 131 MG/DL (ref 65–99)
HCO3 BLDA-SCNC: 19 MMOL/L (ref 17–25)
HCT VFR BLD AUTO: 37.9 % (ref 37–47)
HGB BLD-MCNC: 11.4 G/DL (ref 12–16)
HOROWITZ INDEX BLDA+IHG-RTO: 261 MM[HG]
IMM GRANULOCYTES # BLD AUTO: 0.02 K/UL (ref 0–0.11)
IMM GRANULOCYTES NFR BLD AUTO: 0.2 % (ref 0–0.9)
INST. QUALIFIED PATIENT IIQPT: YES
LACTATE BLD-SCNC: 0.9 MMOL/L (ref 0.5–2)
LYMPHOCYTES # BLD AUTO: 1.55 K/UL (ref 1–4.8)
LYMPHOCYTES NFR BLD: 15.6 % (ref 22–41)
MAGNESIUM SERPL-MCNC: 2.4 MG/DL (ref 1.5–2.5)
MCH RBC QN AUTO: 29.2 PG (ref 27–33)
MCHC RBC AUTO-ENTMCNC: 30.1 G/DL (ref 33.6–35)
MCV RBC AUTO: 96.9 FL (ref 81.4–97.8)
MONOCYTES # BLD AUTO: 0.38 K/UL (ref 0–0.85)
MONOCYTES NFR BLD AUTO: 3.8 % (ref 0–13.4)
NEUTROPHILS # BLD AUTO: 7.98 K/UL (ref 2–7.15)
NEUTROPHILS NFR BLD: 80.2 % (ref 44–72)
NRBC # BLD AUTO: 0 K/UL
NRBC BLD-RTO: 0 /100 WBC
O2/TOTAL GAS SETTING VFR VENT: 28 %
PCO2 BLDA: 42.2 MMHG (ref 26–37)
PCO2 TEMP ADJ BLDA: 43.5 MMHG (ref 26–37)
PH BLDA: 7.26 [PH] (ref 7.4–7.5)
PH TEMP ADJ BLDA: 7.25 [PH] (ref 7.4–7.5)
PHOSPHATE SERPL-MCNC: 2.4 MG/DL (ref 2.5–4.5)
PLATELET # BLD AUTO: 176 K/UL (ref 164–446)
PMV BLD AUTO: 10.6 FL (ref 9–12.9)
PO2 BLDA: 73 MMHG (ref 64–87)
PO2 TEMP ADJ BLDA: 76 MMHG (ref 64–87)
POTASSIUM SERPL-SCNC: 4.9 MMOL/L (ref 3.6–5.5)
PROT SERPL-MCNC: 6 G/DL (ref 6–8.2)
RBC # BLD AUTO: 3.91 M/UL (ref 4.2–5.4)
SAO2 % BLDA: 92 % (ref 93–99)
SODIUM SERPL-SCNC: 140 MMOL/L (ref 135–145)
SPECIMEN DRAWN FROM PATIENT: ABNORMAL
WBC # BLD AUTO: 10 K/UL (ref 4.8–10.8)

## 2021-01-01 PROCEDURE — 99291 CRITICAL CARE FIRST HOUR: CPT | Performed by: INTERNAL MEDICINE

## 2021-01-01 PROCEDURE — 36600 WITHDRAWAL OF ARTERIAL BLOOD: CPT

## 2021-01-01 PROCEDURE — C9113 INJ PANTOPRAZOLE SODIUM, VIA: HCPCS | Performed by: SURGERY

## 2021-01-01 PROCEDURE — 83605 ASSAY OF LACTIC ACID: CPT

## 2021-01-01 PROCEDURE — 700105 HCHG RX REV CODE 258: Performed by: INTERNAL MEDICINE

## 2021-01-01 PROCEDURE — 82803 BLOOD GASES ANY COMBINATION: CPT

## 2021-01-01 PROCEDURE — 83735 ASSAY OF MAGNESIUM: CPT

## 2021-01-01 PROCEDURE — 700105 HCHG RX REV CODE 258: Performed by: SURGERY

## 2021-01-01 PROCEDURE — 700111 HCHG RX REV CODE 636 W/ 250 OVERRIDE (IP): Performed by: INTERNAL MEDICINE

## 2021-01-01 PROCEDURE — 700111 HCHG RX REV CODE 636 W/ 250 OVERRIDE (IP): Performed by: SURGERY

## 2021-01-01 PROCEDURE — 82962 GLUCOSE BLOOD TEST: CPT | Mod: 91

## 2021-01-01 PROCEDURE — 80053 COMPREHEN METABOLIC PANEL: CPT

## 2021-01-01 PROCEDURE — 770022 HCHG ROOM/CARE - ICU (200)

## 2021-01-01 PROCEDURE — 700102 HCHG RX REV CODE 250 W/ 637 OVERRIDE(OP): Performed by: INTERNAL MEDICINE

## 2021-01-01 PROCEDURE — 700101 HCHG RX REV CODE 250: Performed by: INTERNAL MEDICINE

## 2021-01-01 PROCEDURE — 93308 TTE F-UP OR LMTD: CPT | Mod: 26 | Performed by: INTERNAL MEDICINE

## 2021-01-01 PROCEDURE — 84100 ASSAY OF PHOSPHORUS: CPT

## 2021-01-01 PROCEDURE — 85025 COMPLETE CBC W/AUTO DIFF WBC: CPT

## 2021-01-01 RX ORDER — SODIUM CHLORIDE, SODIUM LACTATE, POTASSIUM CHLORIDE, CALCIUM CHLORIDE 600; 310; 30; 20 MG/100ML; MG/100ML; MG/100ML; MG/100ML
1000 INJECTION, SOLUTION INTRAVENOUS CONTINUOUS
Status: DISCONTINUED | OUTPATIENT
Start: 2021-01-01 | End: 2021-01-01

## 2021-01-01 RX ORDER — DEXTROSE, SODIUM CHLORIDE, SODIUM LACTATE, POTASSIUM CHLORIDE, AND CALCIUM CHLORIDE 5; .6; .31; .03; .02 G/100ML; G/100ML; G/100ML; G/100ML; G/100ML
INJECTION, SOLUTION INTRAVENOUS CONTINUOUS
Status: DISCONTINUED | OUTPATIENT
Start: 2021-01-01 | End: 2021-01-04

## 2021-01-01 RX ADMIN — INSULIN LISPRO 3 UNITS: 100 INJECTION, SOLUTION INTRAVENOUS; SUBCUTANEOUS at 12:58

## 2021-01-01 RX ADMIN — PIPERACILLIN AND TAZOBACTAM 4.5 G: 4; .5 INJECTION, POWDER, LYOPHILIZED, FOR SOLUTION INTRAVENOUS; PARENTERAL at 13:03

## 2021-01-01 RX ADMIN — SODIUM PHOSPHATE, MONOBASIC, MONOHYDRATE AND SODIUM PHOSPHATE, DIBASIC, ANHYDROUS 15 MMOL: 276; 142 INJECTION, SOLUTION INTRAVENOUS at 08:21

## 2021-01-01 RX ADMIN — FENTANYL CITRATE 50 MCG: 50 INJECTION, SOLUTION INTRAMUSCULAR; INTRAVENOUS at 19:58

## 2021-01-01 RX ADMIN — PANTOPRAZOLE SODIUM 8 MG/HR: 40 INJECTION, POWDER, FOR SOLUTION INTRAVENOUS at 00:01

## 2021-01-01 RX ADMIN — INSULIN LISPRO 3 UNITS: 100 INJECTION, SOLUTION INTRAVENOUS; SUBCUTANEOUS at 18:05

## 2021-01-01 RX ADMIN — FENTANYL CITRATE 50 MCG: 50 INJECTION, SOLUTION INTRAMUSCULAR; INTRAVENOUS at 23:13

## 2021-01-01 RX ADMIN — PIPERACILLIN AND TAZOBACTAM 4.5 G: 4; .5 INJECTION, POWDER, LYOPHILIZED, FOR SOLUTION INTRAVENOUS; PARENTERAL at 06:00

## 2021-01-01 RX ADMIN — SODIUM CHLORIDE, SODIUM LACTATE, POTASSIUM CHLORIDE, CALCIUM CHLORIDE AND DEXTROSE MONOHYDRATE: 5; 600; 310; 30; 20 INJECTION, SOLUTION INTRAVENOUS at 08:19

## 2021-01-01 RX ADMIN — PIPERACILLIN AND TAZOBACTAM 4.5 G: 4; .5 INJECTION, POWDER, LYOPHILIZED, FOR SOLUTION INTRAVENOUS; PARENTERAL at 22:00

## 2021-01-01 RX ADMIN — PANTOPRAZOLE SODIUM 8 MG/HR: 40 INJECTION, POWDER, FOR SOLUTION INTRAVENOUS at 13:00

## 2021-01-01 ASSESSMENT — PAIN SCALES - WONG BAKER
WONGBAKER_NUMERICALRESPONSE: HURTS JUST A LITTLE BIT
WONGBAKER_NUMERICALRESPONSE: DOESN'T HURT AT ALL
WONGBAKER_NUMERICALRESPONSE: HURTS JUST A LITTLE BIT

## 2021-01-01 ASSESSMENT — COPD QUESTIONNAIRES
DURING THE PAST 4 WEEKS HOW MUCH DID YOU FEEL SHORT OF BREATH: NONE/LITTLE OF THE TIME
DO YOU EVER COUGH UP ANY MUCUS OR PHLEGM?: NO/ONLY WITH OCCASIONAL COLDS OR INFECTIONS
COPD SCREENING SCORE: 3
HAVE YOU SMOKED AT LEAST 100 CIGARETTES IN YOUR ENTIRE LIFE: YES

## 2021-01-01 ASSESSMENT — PAIN DESCRIPTION - PAIN TYPE
TYPE: ACUTE PAIN
TYPE: ACUTE PAIN

## 2021-01-01 ASSESSMENT — FIBROSIS 4 INDEX: FIB4 SCORE: 1.31

## 2021-01-01 ASSESSMENT — LIFESTYLE VARIABLES: EVER_SMOKED: YES

## 2021-01-01 NOTE — PROGRESS NOTES
Critical Care Progress Note    Date of admission  12/30/2020    Chief Complaint  50 y.o. female who presented 12/30/2020 with Hx of gastric bypass 1990's, DM 2, hypothyroidism, obesity that presented to free standing ER for severe abdominal pain that occurred 12/30 early am. She has also had fever to 103. THC use. She had a ct scan that showed a perforated viscouos and was transferred and taken emergently to OR and found to have perforated viscous from a marginal ulcer s/p modified gram patch by Dr Aquino. She was in shock on norepinephrine gtt and was extubated at the end of case and transferred to ICU. She was also Covid +.     Hospital Course  No notes on file    Interval Problem Update  Reviewed last 24 hour events:  3 L o2  Was on LR for 2 L, glucose well controlled, continue npo, d5wLR at 75 ml/hr  Metabolic acidosis on abg  1 mcg/min Levophed  3 days zosyn scheduled  Toth ok to remove    Addendum  Patient off levo short term required to be put back on levo for hypotension    Review of Systems  Review of Systems   Unable to perform ROS: Critical illness        Vital Signs for last 24 hours   Pulse:  [] 96  Resp:  [13-27] 19  BP: ()/(28-89) 98/52  SpO2:  [92 %-100 %] 95 %    Hemodynamic parameters for last 24 hours       Respiratory Information for the last 24 hours       Physical Exam   Physical Exam  Vitals signs and nursing note reviewed.   Constitutional:       General: She is not in acute distress.     Appearance: She is ill-appearing. She is not toxic-appearing or diaphoretic.   HENT:      Head: Normocephalic and atraumatic.      Right Ear: External ear normal.      Left Ear: External ear normal.      Nose: No congestion or rhinorrhea.      Mouth/Throat:      Pharynx: Oropharynx is clear. No oropharyngeal exudate or posterior oropharyngeal erythema.   Eyes:      General: No scleral icterus.     Extraocular Movements: Extraocular movements intact.      Conjunctiva/sclera: Conjunctivae  normal.      Pupils: Pupils are equal, round, and reactive to light.   Neck:      Musculoskeletal: Neck supple. No neck rigidity or muscular tenderness.   Cardiovascular:      Rate and Rhythm: Regular rhythm. Tachycardia present.      Pulses: Normal pulses.      Heart sounds: Normal heart sounds. No murmur.   Pulmonary:      Effort: Respiratory distress present.      Breath sounds: No wheezing.   Abdominal:      General: There is no distension.      Palpations: There is no mass.      Tenderness: There is no abdominal tenderness. There is no guarding.   Musculoskeletal:         General: No swelling or tenderness.      Right lower leg: No edema.      Left lower leg: No edema.   Lymphadenopathy:      Cervical: No cervical adenopathy.   Skin:     Coloration: Skin is not jaundiced or pale.      Findings: No bruising, erythema, lesion or rash.   Neurological:      General: No focal deficit present.      Mental Status: She is alert and oriented to person, place, and time.      Cranial Nerves: No cranial nerve deficit.      Sensory: No sensory deficit.      Motor: No weakness.      Coordination: Coordination normal.      Deep Tendon Reflexes: Reflexes normal.   Psychiatric:         Mood and Affect: Mood normal.         Behavior: Behavior normal.         Medications  Current Facility-Administered Medications   Medication Dose Route Frequency Provider Last Rate Last Admin   • norepinephrine (Levophed) infusion 8 mg/250 mL (premix)  0-30 mcg/min Intravenous Continuous Ricardo Verdugo M.D. 1.9 mL/hr at 01/01/21 0230 1 mcg/min at 01/01/21 0230   • piperacillin-tazobactam (ZOSYN) 4.5 g in  mL IVPB  4.5 g Intravenous Q8HRS Ricardo Verdugo M.D. 25 mL/hr at 01/01/21 0600 4.5 g at 01/01/21 0600   • Respiratory Therapy Consult   Nebulization Continuous RT Ricardo Verdugo M.D.       • ipratropium-albuterol (DUONEB) nebulizer solution  3 mL Nebulization Q2HRS PRN (RT) Ricardo Verdugo M.D.       • senna-docusate (PERICOLACE  or SENOKOT S) 8.6-50 MG per tablet 2 Tab  2 Tab Enteral Tube BID Ricardo Verdugo M.D.   Stopped at 12/31/20 0600    And   • polyethylene glycol/lytes (MIRALAX) PACKET 1 Packet  1 Packet Enteral Tube QDAY PRN Ricardo Verdugo M.D.        And   • magnesium hydroxide (MILK OF MAGNESIA) suspension 30 mL  30 mL Enteral Tube QDAY PRN Ricardo Verdugo M.D.        And   • bisacodyl (DULCOLAX) suppository 10 mg  10 mg Rectal QDAY PRN Ricardo Verdugo M.D.       • MD Alert...ICU Electrolyte Replacement per Pharmacy   Other PHARMACY TO DOSE Ricardo Verdugo M.D.       • fentaNYL (SUBLIMAZE) injection 25-50 mcg  25-50 mcg Intravenous Q HOUR PRN Ricardo Verdugo M.D.   25 mcg at 12/31/20 2255   • pantoprazole (PROTONIX) 80 mg in  mL Infusion  8 mg/hr Intravenous Continuous Bonnie Palacios M.D. 25 mL/hr at 01/01/21 0001 8 mg/hr at 01/01/21 0001   • insulin lispro (HumaLOG) injection  3-14 Units Subcutaneous Q6HRS Jefferson Whitmore M.D.   Stopped at 12/31/20 1230    And   • glucose 4 g chewable tablet 16 g  16 g Oral Q15 MIN PRN Jefferson Whitmore M.D.        And   • dextrose 50% (D50W) injection 50 mL  50 mL Intravenous Q15 MIN PRN Jefferson Whitmore M.D.           Fluids    Intake/Output Summary (Last 24 hours) at 1/1/2021 0652  Last data filed at 1/1/2021 0600  Gross per 24 hour   Intake 1550.93 ml   Output 3150 ml   Net -1599.07 ml       Laboratory  Recent Labs     01/01/21  0243   ISTATAPH 7.262*   ISTATAPCO2 42.2*   ISTATAPO2 73   ISTATATCO2 20   [id number] 92*   ISTATARTHCO3 19.0   ISTATARTBE -8*   ISTATTEMP 37.7 C   ISTATFIO2 28   ISTATSPEC Arterial   ISTATAPHTC 7.252*   [id number] 76     Recent Labs     12/31/20  0330   CPKTOTAL 59     Recent Labs     12/30/20 2002 12/31/20  0955 01/01/21  0238   SODIUM 140 134* 140   POTASSIUM 5.4 5.1 4.9   CHLORIDE 112 111 113*   CO2 16* 18* 21   BUN 27* 29* 23*   CREATININE 2.08* 1.81* 1.30   MAGNESIUM  --  2.8* 2.4   PHOSPHORUS  --  3.3 2.4*   CALCIUM 8.1* 8.0* 7.8*     Recent  Labs     12/30/20 2002 12/31/20  0955 01/01/21  0238   ALTSGPT 21 29 27   ASTSGOT 23 34 23   ALKPHOSPHAT 85 75 66   TBILIRUBIN <0.2 <0.2 <0.2   GLUCOSE 186* 203* 131*     Recent Labs     12/30/20 2002 12/31/20  0955 01/01/21  0238   WBC 12.7* 17.3* 10.0   NEUTSPOLYS 88.20* 87.50* 80.20*   LYMPHOCYTES 7.90* 8.30* 15.60*   MONOCYTES 3.40 3.30 3.80   EOSINOPHILS 0.00 0.00 0.00   BASOPHILS 0.20 0.30 0.20   ASTSGOT 23 34 23   ALTSGPT 21 29 27   ALKPHOSPHAT 85 75 66   TBILIRUBIN <0.2 <0.2 <0.2     Recent Labs     12/30/20 2002 12/31/20  0955 01/01/21  0238   RBC 4.52 4.23 3.91*   HEMOGLOBIN 13.0 12.3 11.4*   HEMATOCRIT 43.5 40.3 37.9   PLATELETCT 253 221 176   PROTHROMBTM  --  15.1*  --    INR  --  1.16*  --        Imaging  X-Ray:  I have personally reviewed the images and compared with prior images.  EKG:  I have personally reviewed the images and compared with prior images.  CT:    Reviewed    Assessment/Plan  Septic shock (HCC)- (present on admission)  Assessment & Plan  This is Severe Sepsis Present on admission  SIRS criteria identified on my evaluation include: Fever, with temperature greater than 101 deg F, Tachycardia, with heart rate greater than 90 BPM and Leukocytosis, with WBC greater than 12,000  Source of infection is perforated viscous  Clinical indicators of end organ dysfunction include Systolic blood pressure (SBP) <90 mmHg or mean arterial pressure <65 mmHg  Sepsis protocol initiated  Fluid resuscitation ordered per protocol  IV antibiotics as appropriate for source of sepsis  Reassessment: I have reassessed the patient's hemodynamic status  End organ dysfunction include(s):  Acute kidney failure    Zosyn and IVF serial monitor lactate    Perforated ulcer (HCC)- (present on admission)  Assessment & Plan  Perforated marginal ulcer s/p modified chanelle patch by Dr Aquino 12/31  NG tube x3-4 days  NPO  Zosyn for 3 days  Serial monitor abdominal exam  Fluid resus    Stridor  Assessment & Plan  Post  operative stridor  S/p dexemethason and racemic epinephrine  Monitor need for emergent intubation    DM (diabetes mellitus) ([facility])  Assessment & Plan  Sliding scale coverage    LAN (acute kidney injury) ([facility])  Assessment & Plan  Fluid resus  Serial monitor renal function  Avoid nephrotoxins  cpk    Class 3 severe obesity in adult ([facility])  Assessment & Plan  Weight reduction and behavior modification  Mobilize   IS   High risk post exlap for atlectasis and need for mechanical ventilation  Limit sedatives/Narcotics    COVID-19  Assessment & Plan  +Covid on intial CT no disease seen  Severe obesity high risk for worsening disease state  Maintain euvolemia  Hold on dexamethason with marginal ulcer and poor wound healing in the obese female daily re-access need  Consider RDV if renal function improves         VTE:  Contraindicated  Ulcer: Not Indicated  Lines: Central Line  Ongoing indication addressed and Toth Catheter  Ongoing indication addressed    I have performed a physical exam and reviewed and updated ROS and Plan today (1/1/2021). In review of yesterday's note (12/31/2020), there are no changes except as documented above.     Discussed patient condition and risk of morbidity and/or mortality with RN, RT, Code status disscussed, Charge nurse / hot rounds, Patient and general surgery     The patient remains critically ill.  Continued on levophed for map > 65.  Critical care time = 32 minutes in directly providing and coordinating critical care and extensive data review.  No time overlap and excludes procedures.

## 2021-01-02 PROBLEM — R06.1 STRIDOR: Status: RESOLVED | Noted: 2020-12-31 | Resolved: 2021-01-02

## 2021-01-02 LAB
ACTION RANGE TRIGGERED IACRT: NO
ANION GAP SERPL CALC-SCNC: 6 MMOL/L (ref 7–16)
BACTERIA UR CULT: NORMAL
BASE EXCESS BLDA CALC-SCNC: -5 MMOL/L (ref -4–3)
BASOPHILS # BLD AUTO: 0.1 % (ref 0–1.8)
BASOPHILS # BLD: 0.01 K/UL (ref 0–0.12)
BODY TEMPERATURE: ABNORMAL DEGREES
BUN SERPL-MCNC: 23 MG/DL (ref 8–22)
CALCIUM SERPL-MCNC: 8.1 MG/DL (ref 8.5–10.5)
CHLORIDE SERPL-SCNC: 114 MMOL/L (ref 96–112)
CO2 BLDA-SCNC: 21 MMOL/L (ref 20–33)
CO2 SERPL-SCNC: 23 MMOL/L (ref 20–33)
CREAT SERPL-MCNC: 1.36 MG/DL (ref 0.5–1.4)
EKG IMPRESSION: NORMAL
EOSINOPHIL # BLD AUTO: 0.03 K/UL (ref 0–0.51)
EOSINOPHIL NFR BLD: 0.4 % (ref 0–6.9)
ERYTHROCYTE [DISTWIDTH] IN BLOOD BY AUTOMATED COUNT: 55.7 FL (ref 35.9–50)
GLUCOSE BLD-MCNC: 133 MG/DL (ref 65–99)
GLUCOSE BLD-MCNC: 140 MG/DL (ref 65–99)
GLUCOSE BLD-MCNC: 158 MG/DL (ref 65–99)
GLUCOSE BLD-MCNC: 162 MG/DL (ref 65–99)
GLUCOSE SERPL-MCNC: 161 MG/DL (ref 65–99)
HCO3 BLDA-SCNC: 20.2 MMOL/L (ref 17–25)
HCT VFR BLD AUTO: 33.6 % (ref 37–47)
HGB BLD-MCNC: 9.9 G/DL (ref 12–16)
HOROWITZ INDEX BLDA+IHG-RTO: 233 MM[HG]
IMM GRANULOCYTES # BLD AUTO: 0.02 K/UL (ref 0–0.11)
IMM GRANULOCYTES NFR BLD AUTO: 0.3 % (ref 0–0.9)
INST. QUALIFIED PATIENT IIQPT: YES
LACTATE BLD-SCNC: 0.8 MMOL/L (ref 0.5–2)
LYMPHOCYTES # BLD AUTO: 1.35 K/UL (ref 1–4.8)
LYMPHOCYTES NFR BLD: 18.6 % (ref 22–41)
MAGNESIUM SERPL-MCNC: 2.3 MG/DL (ref 1.5–2.5)
MCH RBC QN AUTO: 28.9 PG (ref 27–33)
MCHC RBC AUTO-ENTMCNC: 29.5 G/DL (ref 33.6–35)
MCV RBC AUTO: 98 FL (ref 81.4–97.8)
MONOCYTES # BLD AUTO: 0.25 K/UL (ref 0–0.85)
MONOCYTES NFR BLD AUTO: 3.4 % (ref 0–13.4)
NEUTROPHILS # BLD AUTO: 5.59 K/UL (ref 2–7.15)
NEUTROPHILS NFR BLD: 77.2 % (ref 44–72)
NRBC # BLD AUTO: 0 K/UL
NRBC BLD-RTO: 0 /100 WBC
O2/TOTAL GAS SETTING VFR VENT: 24 %
PCO2 BLDA: 39.1 MMHG (ref 26–37)
PCO2 TEMP ADJ BLDA: 39.2 MMHG (ref 26–37)
PH BLDA: 7.32 [PH] (ref 7.4–7.5)
PH TEMP ADJ BLDA: 7.32 [PH] (ref 7.4–7.5)
PHOSPHATE SERPL-MCNC: 2.3 MG/DL (ref 2.5–4.5)
PLATELET # BLD AUTO: 184 K/UL (ref 164–446)
PMV BLD AUTO: 10.5 FL (ref 9–12.9)
PO2 BLDA: 56 MMHG (ref 64–87)
PO2 TEMP ADJ BLDA: 56 MMHG (ref 64–87)
POTASSIUM SERPL-SCNC: 4.4 MMOL/L (ref 3.6–5.5)
RBC # BLD AUTO: 3.43 M/UL (ref 4.2–5.4)
SAO2 % BLDA: 86 % (ref 93–99)
SIGNIFICANT IND 70042: NORMAL
SITE SITE: NORMAL
SODIUM SERPL-SCNC: 143 MMOL/L (ref 135–145)
SOURCE SOURCE: NORMAL
SPECIMEN DRAWN FROM PATIENT: ABNORMAL
WBC # BLD AUTO: 7.3 K/UL (ref 4.8–10.8)

## 2021-01-02 PROCEDURE — 36600 WITHDRAWAL OF ARTERIAL BLOOD: CPT

## 2021-01-02 PROCEDURE — 700105 HCHG RX REV CODE 258: Performed by: INTERNAL MEDICINE

## 2021-01-02 PROCEDURE — 700111 HCHG RX REV CODE 636 W/ 250 OVERRIDE (IP): Performed by: INTERNAL MEDICINE

## 2021-01-02 PROCEDURE — 93010 ELECTROCARDIOGRAM REPORT: CPT | Performed by: INTERNAL MEDICINE

## 2021-01-02 PROCEDURE — 82962 GLUCOSE BLOOD TEST: CPT | Mod: 91

## 2021-01-02 PROCEDURE — 700111 HCHG RX REV CODE 636 W/ 250 OVERRIDE (IP): Performed by: SURGERY

## 2021-01-02 PROCEDURE — 84100 ASSAY OF PHOSPHORUS: CPT

## 2021-01-02 PROCEDURE — 85025 COMPLETE CBC W/AUTO DIFF WBC: CPT

## 2021-01-02 PROCEDURE — 770022 HCHG ROOM/CARE - ICU (200)

## 2021-01-02 PROCEDURE — 82803 BLOOD GASES ANY COMBINATION: CPT

## 2021-01-02 PROCEDURE — 83735 ASSAY OF MAGNESIUM: CPT

## 2021-01-02 PROCEDURE — 700105 HCHG RX REV CODE 258: Performed by: SURGERY

## 2021-01-02 PROCEDURE — 80048 BASIC METABOLIC PNL TOTAL CA: CPT

## 2021-01-02 PROCEDURE — C9113 INJ PANTOPRAZOLE SODIUM, VIA: HCPCS | Performed by: SURGERY

## 2021-01-02 PROCEDURE — C9113 INJ PANTOPRAZOLE SODIUM, VIA: HCPCS | Performed by: INTERNAL MEDICINE

## 2021-01-02 PROCEDURE — 700101 HCHG RX REV CODE 250: Performed by: INTERNAL MEDICINE

## 2021-01-02 PROCEDURE — 93005 ELECTROCARDIOGRAM TRACING: CPT | Performed by: INTERNAL MEDICINE

## 2021-01-02 PROCEDURE — 99291 CRITICAL CARE FIRST HOUR: CPT | Performed by: INTERNAL MEDICINE

## 2021-01-02 PROCEDURE — 83605 ASSAY OF LACTIC ACID: CPT

## 2021-01-02 RX ORDER — PANTOPRAZOLE SODIUM 40 MG/10ML
40 INJECTION, POWDER, LYOPHILIZED, FOR SOLUTION INTRAVENOUS 2 TIMES DAILY
Status: DISCONTINUED | OUTPATIENT
Start: 2021-01-02 | End: 2021-01-04

## 2021-01-02 RX ADMIN — PIPERACILLIN AND TAZOBACTAM 4.5 G: 4; .5 INJECTION, POWDER, LYOPHILIZED, FOR SOLUTION INTRAVENOUS; PARENTERAL at 22:30

## 2021-01-02 RX ADMIN — PIPERACILLIN AND TAZOBACTAM 4.5 G: 4; .5 INJECTION, POWDER, LYOPHILIZED, FOR SOLUTION INTRAVENOUS; PARENTERAL at 13:43

## 2021-01-02 RX ADMIN — PIPERACILLIN AND TAZOBACTAM 4.5 G: 4; .5 INJECTION, POWDER, LYOPHILIZED, FOR SOLUTION INTRAVENOUS; PARENTERAL at 04:54

## 2021-01-02 RX ADMIN — SODIUM PHOSPHATE, MONOBASIC, MONOHYDRATE AND SODIUM PHOSPHATE, DIBASIC, ANHYDROUS 15 MMOL: 276; 142 INJECTION, SOLUTION INTRAVENOUS at 07:59

## 2021-01-02 RX ADMIN — FENTANYL CITRATE 50 MCG: 50 INJECTION, SOLUTION INTRAMUSCULAR; INTRAVENOUS at 04:55

## 2021-01-02 RX ADMIN — FENTANYL CITRATE 50 MCG: 50 INJECTION, SOLUTION INTRAMUSCULAR; INTRAVENOUS at 17:08

## 2021-01-02 RX ADMIN — FENTANYL CITRATE 50 MCG: 50 INJECTION, SOLUTION INTRAMUSCULAR; INTRAVENOUS at 22:57

## 2021-01-02 RX ADMIN — SODIUM CHLORIDE, SODIUM LACTATE, POTASSIUM CHLORIDE, CALCIUM CHLORIDE AND DEXTROSE MONOHYDRATE: 5; 600; 310; 30; 20 INJECTION, SOLUTION INTRAVENOUS at 12:25

## 2021-01-02 RX ADMIN — FENTANYL CITRATE 50 MCG: 50 INJECTION, SOLUTION INTRAMUSCULAR; INTRAVENOUS at 07:59

## 2021-01-02 RX ADMIN — FENTANYL CITRATE 50 MCG: 50 INJECTION, SOLUTION INTRAMUSCULAR; INTRAVENOUS at 11:12

## 2021-01-02 RX ADMIN — INSULIN LISPRO 3 UNITS: 100 INJECTION, SOLUTION INTRAVENOUS; SUBCUTANEOUS at 11:32

## 2021-01-02 RX ADMIN — FENTANYL CITRATE 50 MCG: 50 INJECTION, SOLUTION INTRAMUSCULAR; INTRAVENOUS at 19:26

## 2021-01-02 RX ADMIN — PANTOPRAZOLE SODIUM 8 MG/HR: 40 INJECTION, POWDER, FOR SOLUTION INTRAVENOUS at 07:59

## 2021-01-02 RX ADMIN — INSULIN LISPRO 3 UNITS: 100 INJECTION, SOLUTION INTRAVENOUS; SUBCUTANEOUS at 05:08

## 2021-01-02 RX ADMIN — PANTOPRAZOLE SODIUM 40 MG: 40 INJECTION, POWDER, FOR SOLUTION INTRAVENOUS at 17:30

## 2021-01-02 ASSESSMENT — ENCOUNTER SYMPTOMS
HEMOPTYSIS: 0
BLURRED VISION: 0
STRIDOR: 0
HEARTBURN: 0
PALPITATIONS: 0
SINUS PAIN: 0
SPUTUM PRODUCTION: 0
BLOOD IN STOOL: 0
FOCAL WEAKNESS: 0
TINGLING: 0
MYALGIAS: 0
NERVOUS/ANXIOUS: 0
PHOTOPHOBIA: 0
FEVER: 0
POLYDIPSIA: 0
WHEEZING: 0
SHORTNESS OF BREATH: 0
CHILLS: 0
NECK PAIN: 0
VOMITING: 0
SPEECH CHANGE: 0
DIAPHORESIS: 0
WEIGHT LOSS: 0
BACK PAIN: 0
COUGH: 0
BRUISES/BLEEDS EASILY: 0
DEPRESSION: 0
DIZZINESS: 0
HEADACHES: 0
DOUBLE VISION: 0
SENSORY CHANGE: 0

## 2021-01-02 ASSESSMENT — PATIENT HEALTH QUESTIONNAIRE - PHQ9
SUM OF ALL RESPONSES TO PHQ9 QUESTIONS 1 AND 2: 0
2. FEELING DOWN, DEPRESSED, IRRITABLE, OR HOPELESS: NOT AT ALL
1. LITTLE INTEREST OR PLEASURE IN DOING THINGS: NOT AT ALL

## 2021-01-02 ASSESSMENT — PAIN SCALES - WONG BAKER: WONGBAKER_NUMERICALRESPONSE: HURTS JUST A LITTLE BIT

## 2021-01-02 ASSESSMENT — PAIN DESCRIPTION - PAIN TYPE
TYPE: SURGICAL PAIN
TYPE: SURGICAL PAIN

## 2021-01-02 ASSESSMENT — FIBROSIS 4 INDEX
FIB4 SCORE: 1.250925583244189156
FIB4 SCORE: 1.250925583244189156

## 2021-01-02 NOTE — PROGRESS NOTES
Critical Care Progress Note    Date of admission  12/30/2020    Chief Complaint  50 y.o. female who presented 12/30/2020 with Hx of gastric bypass 1990's, DM 2, hypothyroidism, obesity that presented to free standing ER for severe abdominal pain that occurred 12/30 early am. She has also had fever to 103. THC use. She had a ct scan that showed a perforated viscouos and was transferred and taken emergently to OR and found to have perforated viscous from a marginal ulcer s/p modified gram patch by Dr Aquino. She was in shock on norepinephrine gtt and was extubated at the end of case and transferred to ICU. She was also Covid +.     Hospital Course  No notes on file    Interval Problem Update  Reviewed last 24 hour events:  2 l nc  On 1 mcg/min vasopressors  Remains npo  IJ triple in place  No leon  ngt  ppi drip, changed to bid, discussed w/ surgery    Addendum  Ng tube pulled, Dr Romero updated, hold for now until evaluated    Review of Systems  Review of Systems   Unable to perform ROS: Critical illness   Constitutional: Positive for malaise/fatigue. Negative for chills, diaphoresis, fever and weight loss.   HENT: Negative for congestion and sinus pain.    Eyes: Negative for blurred vision, double vision and photophobia.   Respiratory: Negative for cough, hemoptysis, sputum production, shortness of breath, wheezing and stridor.    Cardiovascular: Negative for chest pain, palpitations and leg swelling.   Gastrointestinal: Negative for blood in stool, heartburn, melena and vomiting.        Abdominal discomfort as expected   Genitourinary: Negative for dysuria and urgency.   Musculoskeletal: Negative for back pain, myalgias and neck pain.   Skin: Negative for itching and rash.   Neurological: Negative for dizziness, tingling, sensory change, speech change, focal weakness and headaches.   Endo/Heme/Allergies: Negative for polydipsia. Does not bruise/bleed easily.   Psychiatric/Behavioral: Negative for depression.  The patient is not nervous/anxious.         Vital Signs for last 24 hours   Temp:  [36.3 °C (97.3 °F)] 36.3 °C (97.3 °F)  Pulse:  [] 99  Resp:  [12-27] 17  BP: ()/(35-59) 124/55  SpO2:  [87 %-97 %] 92 %    Hemodynamic parameters for last 24 hours       Respiratory Information for the last 24 hours       Physical Exam   Physical Exam  Vitals signs and nursing note reviewed.   Constitutional:       General: She is not in acute distress.     Appearance: She is ill-appearing. She is not toxic-appearing or diaphoretic.   HENT:      Head: Normocephalic and atraumatic.      Right Ear: External ear normal.      Left Ear: External ear normal.      Nose: No congestion or rhinorrhea.      Mouth/Throat:      Pharynx: Oropharynx is clear. No oropharyngeal exudate or posterior oropharyngeal erythema.   Eyes:      General: No scleral icterus.     Extraocular Movements: Extraocular movements intact.      Conjunctiva/sclera: Conjunctivae normal.      Pupils: Pupils are equal, round, and reactive to light.   Neck:      Musculoskeletal: Neck supple. No neck rigidity or muscular tenderness.   Cardiovascular:      Rate and Rhythm: Normal rate and regular rhythm.      Pulses: Normal pulses.      Heart sounds: Normal heart sounds. No murmur.   Pulmonary:      Effort: No respiratory distress.      Breath sounds: No wheezing.   Abdominal:      General: There is no distension.      Palpations: There is no mass.      Tenderness: There is no abdominal tenderness. There is no guarding.   Musculoskeletal:         General: No swelling or tenderness.      Right lower leg: No edema.      Left lower leg: No edema.   Lymphadenopathy:      Cervical: No cervical adenopathy.   Skin:     Coloration: Skin is not jaundiced or pale.      Findings: No bruising, erythema, lesion or rash.   Neurological:      General: No focal deficit present.      Mental Status: She is alert and oriented to person, place, and time.      Cranial Nerves: No cranial  nerve deficit.      Sensory: No sensory deficit.      Motor: No weakness.      Coordination: Coordination normal.      Deep Tendon Reflexes: Reflexes normal.   Psychiatric:         Mood and Affect: Mood normal.         Behavior: Behavior normal.         Medications  Current Facility-Administered Medications   Medication Dose Route Frequency Provider Last Rate Last Admin   • sodium phosphate 15 mmol in 5% dextrose 250 mL ivpb  15 mmol Intravenous Once Jefferson Whitmore M.D.       • D5LR infusion   Intravenous Continuous Jefferson Whitmore M.D. 75 mL/hr at 01/01/21 1600 Rate Change at 01/01/21 1600   • norepinephrine (Levophed) infusion 8 mg/250 mL (premix)  0-30 mcg/min Intravenous Continuous Ricardo Verdugo M.D. 1.9 mL/hr at 01/01/21 1221 1 mcg/min at 01/01/21 1221   • piperacillin-tazobactam (ZOSYN) 4.5 g in  mL IVPB  4.5 g Intravenous Q8HRS Ricardo Verdugo M.D. 25 mL/hr at 01/02/21 0454 4.5 g at 01/02/21 0454   • Respiratory Therapy Consult   Nebulization Continuous RT Ricardo Verdugo M.D.       • ipratropium-albuterol (DUONEB) nebulizer solution  3 mL Nebulization Q2HRS PRN (RT) Ricardo Verdugo M.D.       • senna-docusate (PERICOLACE or SENOKOT S) 8.6-50 MG per tablet 2 Tab  2 Tab Enteral Tube BID Ricardo Verdugo M.D.   Stopped at 12/31/20 0600    And   • polyethylene glycol/lytes (MIRALAX) PACKET 1 Packet  1 Packet Enteral Tube QDAY PRN Ricardo Verdugo M.D.        And   • magnesium hydroxide (MILK OF MAGNESIA) suspension 30 mL  30 mL Enteral Tube QDAY PRN Ricardo Verdugo M.D.        And   • bisacodyl (DULCOLAX) suppository 10 mg  10 mg Rectal QDAY PRN Ricardo Verdugo M.D.       • MD Alert...ICU Electrolyte Replacement per Pharmacy   Other PHARMACY TO DOSE Ricardo Verdugo M.D.       • fentaNYL (SUBLIMAZE) injection 25-50 mcg  25-50 mcg Intravenous Q HOUR PRN Ricardo Verdugo M.D.   50 mcg at 01/02/21 0455   • pantoprazole (PROTONIX) 80 mg in  mL Infusion  8 mg/hr Intravenous Continuous  Bonnie Palacios M.D. 25 mL/hr at 01/01/21 1300 8 mg/hr at 01/01/21 1300   • insulin lispro (HumaLOG) injection  3-14 Units Subcutaneous Q6HRS Jefferson Whitmore M.D.   3 Units at 01/02/21 0508    And   • glucose 4 g chewable tablet 16 g  16 g Oral Q15 MIN PRN Jefferson Whitmore M.D.        And   • dextrose 50% (D50W) injection 50 mL  50 mL Intravenous Q15 MIN PRN Jefferson Whitmore M.D.           Fluids    Intake/Output Summary (Last 24 hours) at 1/2/2021 0655  Last data filed at 1/2/2021 0600  Gross per 24 hour   Intake 1417.06 ml   Output 300 ml   Net 1117.06 ml       Laboratory  Recent Labs     01/01/21  0243 01/02/21  0238   ISTATAPH 7.262* 7.321*   ISTATAPCO2 42.2* 39.1*   ISTATAPO2 73 56*   ISTATATCO2 20 21   HOFCDUG5BSF 92* 86*   ISTATARTHCO3 19.0 20.2   ISTATARTBE -8* -5*   ISTATTEMP 37.7 C 98.7 F   ISTATFIO2 28 24   ISTATSPEC Arterial Arterial   ISTATAPHTC 7.252* 7.320*   FYOXRUDH1CK 76 56*     Recent Labs     12/31/20  0330   CPKTOTAL 59     Recent Labs     12/31/20  0955 01/01/21  0238 01/02/21  0500   SODIUM 134* 140 143   POTASSIUM 5.1 4.9 4.4   CHLORIDE 111 113* 114*   CO2 18* 21 23   BUN 29* 23* 23*   CREATININE 1.81* 1.30 1.36   MAGNESIUM 2.8* 2.4 2.3   PHOSPHORUS 3.3 2.4* 2.3*   CALCIUM 8.0* 7.8* 8.1*     Recent Labs     12/30/20 2002 12/31/20  0955 01/01/21  0238 01/02/21  0500   ALTSGPT 21 29 27  --    ASTSGOT 23 34 23  --    ALKPHOSPHAT 85 75 66  --    TBILIRUBIN <0.2 <0.2 <0.2  --    GLUCOSE 186* 203* 131* 161*     Recent Labs     12/30/20 2002 12/31/20  0955 01/01/21 0238 01/02/21  0500   WBC 12.7* 17.3* 10.0 7.3   NEUTSPOLYS 88.20* 87.50* 80.20* 77.20*   LYMPHOCYTES 7.90* 8.30* 15.60* 18.60*   MONOCYTES 3.40 3.30 3.80 3.40   EOSINOPHILS 0.00 0.00 0.00 0.40   BASOPHILS 0.20 0.30 0.20 0.10   ASTSGOT 23 34 23  --    ALTSGPT 21 29 27  --    ALKPHOSPHAT 85 75 66  --    TBILIRUBIN <0.2 <0.2 <0.2  --      Recent Labs     12/31/20 0955 01/01/21 0238 01/02/21  0500   RBC 4.23 3.91* 3.43*   HEMOGLOBIN 12.3  11.4* 9.9*   HEMATOCRIT 40.3 37.9 33.6*   PLATELETCT 221 176 184   PROTHROMBTM 15.1*  --   --    INR 1.16*  --   --        Imaging  X-Ray:  I have personally reviewed the images and compared with prior images.  EKG:  I have personally reviewed the images and compared with prior images.  CT:    Reviewed    Assessment/Plan  Septic shock ([facility])- (present on admission)  Assessment & Plan  This is Severe Sepsis Present on admission  SIRS criteria identified on my evaluation include: Fever, with temperature greater than 101 deg F, Tachycardia, with heart rate greater than 90 BPM and Leukocytosis, with WBC greater than 12,000  Source of infection is perforated viscous  Clinical indicators of end organ dysfunction include Systolic blood pressure (SBP) <90 mmHg or mean arterial pressure <65 mmHg  Sepsis protocol initiated  Fluid resuscitation ordered per protocol  IV antibiotics as appropriate for source of sepsis  Reassessment: I have reassessed the patient's hemodynamic status  End organ dysfunction include(s):  Acute kidney failure    Zosyn and IVF maint  Limited o2  Once po status start midodrine    Perforated ulcer ([facility])- (present on admission)  Assessment & Plan  Perforated marginal ulcer s/p modified chanelle patch by Dr Aquino 12/31  NG tube cont per surgery  NPO  Zosyn for 3 days  Serial monitor abdominal exam  On fluids w/ d5w    DM (diabetes mellitus) ([facility])  Assessment & Plan  Sliding scale coverage  lantus held given npo status (patient says does not take outpt regimen on mar)    LAN (acute kidney injury) ([facility])  Assessment & Plan  Fluid resus  Serial monitor renal function  Avoid nephrotoxins  cpk    Class 3 severe obesity in adult ([facility])  Assessment & Plan  Weight reduction and behavior modification  Mobilize   IS   High risk post exlap for atlectasis and need for mechanical ventilation  Limit sedatives/Narcotics    COVID-19  Assessment & Plan  +Covid on intial CT no disease seen  Severe obesity high risk for  worsening disease state  Maintain euvolemia  Hold on dexamethasone with marginal ulcer and poor wound healing in the obese female  Now only on 2 L o2, not necessary         VTE:  Contraindicated  Ulcer: Not Indicated  Lines: Central Line  Ongoing indication addressed and Toth Catheter  Ongoing indication addressed    I have performed a physical exam and reviewed and updated ROS and Plan today (1/2/2021). In review of yesterday's note (1/1/2021), there are no changes except as documented above.     Discussed patient condition and risk of morbidity and/or mortality with RN, RT, Code status disscussed, Charge nurse / hot rounds, Patient and general surgery     The patient remains critically ill.  Continued on levophed for map > 65, active titration.  Critical care time = 34 minutes in directly providing and coordinating critical care and extensive data review.  No time overlap and excludes procedures.

## 2021-01-02 NOTE — PROGRESS NOTES
1450- attempted to call dr romero, however the number in the directory is incorrect.        1455- dr ackerman called due to the pt pulling out her N/G while getting up to [facility]. The operative report says that it was placed in the OR past the repair 5-6cm, CN aware. And Dr. Ackerman reports he will call Dr. Romero to see what needs to be done. Pt has had no output via n/g this shift and is passing gas and stool.     1430- dr ackerman reported dr romero will be by later to see the patient, but for now the n/g can be left out.

## 2021-01-03 PROBLEM — E03.9 HYPOTHYROID: Status: ACTIVE | Noted: 2021-01-03

## 2021-01-03 LAB
ALBUMIN SERPL BCP-MCNC: 2.9 G/DL (ref 3.2–4.9)
ALBUMIN/GLOB SERPL: 0.9 G/DL
ALP SERPL-CCNC: 76 U/L (ref 30–99)
ALT SERPL-CCNC: 53 U/L (ref 2–50)
ANION GAP SERPL CALC-SCNC: 10 MMOL/L (ref 7–16)
AST SERPL-CCNC: 41 U/L (ref 12–45)
BASOPHILS # BLD AUTO: 0.3 % (ref 0–1.8)
BASOPHILS # BLD: 0.02 K/UL (ref 0–0.12)
BILIRUB SERPL-MCNC: 0.2 MG/DL (ref 0.1–1.5)
BUN SERPL-MCNC: 17 MG/DL (ref 8–22)
CALCIUM SERPL-MCNC: 8.4 MG/DL (ref 8.5–10.5)
CHLORIDE SERPL-SCNC: 115 MMOL/L (ref 96–112)
CO2 SERPL-SCNC: 21 MMOL/L (ref 20–33)
CREAT SERPL-MCNC: 1.38 MG/DL (ref 0.5–1.4)
EOSINOPHIL # BLD AUTO: 0.01 K/UL (ref 0–0.51)
EOSINOPHIL NFR BLD: 0.2 % (ref 0–6.9)
ERYTHROCYTE [DISTWIDTH] IN BLOOD BY AUTOMATED COUNT: 55.4 FL (ref 35.9–50)
GLOBULIN SER CALC-MCNC: 3.4 G/DL (ref 1.9–3.5)
GLUCOSE BLD-MCNC: 148 MG/DL (ref 65–99)
GLUCOSE BLD-MCNC: 152 MG/DL (ref 65–99)
GLUCOSE SERPL-MCNC: 154 MG/DL (ref 65–99)
HCT VFR BLD AUTO: 34.4 % (ref 37–47)
HGB BLD-MCNC: 10.3 G/DL (ref 12–16)
IMM GRANULOCYTES # BLD AUTO: 0.02 K/UL (ref 0–0.11)
IMM GRANULOCYTES NFR BLD AUTO: 0.3 % (ref 0–0.9)
LACTATE BLD-SCNC: 1.1 MMOL/L (ref 0.5–2)
LYMPHOCYTES # BLD AUTO: 1.04 K/UL (ref 1–4.8)
LYMPHOCYTES NFR BLD: 18 % (ref 22–41)
MAGNESIUM SERPL-MCNC: 2.1 MG/DL (ref 1.5–2.5)
MCH RBC QN AUTO: 29.3 PG (ref 27–33)
MCHC RBC AUTO-ENTMCNC: 29.9 G/DL (ref 33.6–35)
MCV RBC AUTO: 98 FL (ref 81.4–97.8)
MONOCYTES # BLD AUTO: 0.21 K/UL (ref 0–0.85)
MONOCYTES NFR BLD AUTO: 3.6 % (ref 0–13.4)
NEUTROPHILS # BLD AUTO: 4.47 K/UL (ref 2–7.15)
NEUTROPHILS NFR BLD: 77.6 % (ref 44–72)
NRBC # BLD AUTO: 0 K/UL
NRBC BLD-RTO: 0 /100 WBC
PHOSPHATE SERPL-MCNC: 2.5 MG/DL (ref 2.5–4.5)
PLATELET # BLD AUTO: 197 K/UL (ref 164–446)
PMV BLD AUTO: 10.6 FL (ref 9–12.9)
POTASSIUM SERPL-SCNC: 4 MMOL/L (ref 3.6–5.5)
PROCALCITONIN SERPL-MCNC: 6.24 NG/ML
PROT SERPL-MCNC: 6.3 G/DL (ref 6–8.2)
RBC # BLD AUTO: 3.51 M/UL (ref 4.2–5.4)
SODIUM SERPL-SCNC: 146 MMOL/L (ref 135–145)
WBC # BLD AUTO: 5.8 K/UL (ref 4.8–10.8)

## 2021-01-03 PROCEDURE — 82962 GLUCOSE BLOOD TEST: CPT

## 2021-01-03 PROCEDURE — 700111 HCHG RX REV CODE 636 W/ 250 OVERRIDE (IP): Performed by: INTERNAL MEDICINE

## 2021-01-03 PROCEDURE — 84145 PROCALCITONIN (PCT): CPT

## 2021-01-03 PROCEDURE — 700105 HCHG RX REV CODE 258: Performed by: INTERNAL MEDICINE

## 2021-01-03 PROCEDURE — 99223 1ST HOSP IP/OBS HIGH 75: CPT | Performed by: HOSPITALIST

## 2021-01-03 PROCEDURE — C9113 INJ PANTOPRAZOLE SODIUM, VIA: HCPCS | Performed by: INTERNAL MEDICINE

## 2021-01-03 PROCEDURE — 770021 HCHG ROOM/CARE - ISO PRIVATE

## 2021-01-03 PROCEDURE — 84100 ASSAY OF PHOSPHORUS: CPT

## 2021-01-03 PROCEDURE — 83605 ASSAY OF LACTIC ACID: CPT

## 2021-01-03 PROCEDURE — 700102 HCHG RX REV CODE 250 W/ 637 OVERRIDE(OP): Performed by: HOSPITALIST

## 2021-01-03 PROCEDURE — 80053 COMPREHEN METABOLIC PANEL: CPT

## 2021-01-03 PROCEDURE — A9270 NON-COVERED ITEM OR SERVICE: HCPCS | Performed by: HOSPITALIST

## 2021-01-03 PROCEDURE — 83735 ASSAY OF MAGNESIUM: CPT

## 2021-01-03 PROCEDURE — 85025 COMPLETE CBC W/AUTO DIFF WBC: CPT

## 2021-01-03 RX ORDER — OXYCODONE HYDROCHLORIDE 5 MG/1
5-10 TABLET ORAL
Status: DISCONTINUED | OUTPATIENT
Start: 2021-01-03 | End: 2021-01-07 | Stop reason: HOSPADM

## 2021-01-03 RX ORDER — DULAGLUTIDE 0.75 MG/.5ML
1 INJECTION, SOLUTION SUBCUTANEOUS
COMMUNITY

## 2021-01-03 RX ORDER — VENLAFAXINE 75 MG/1
75 TABLET ORAL 2 TIMES DAILY
COMMUNITY

## 2021-01-03 RX ORDER — ARIPIPRAZOLE 10 MG/1
10 TABLET ORAL DAILY
COMMUNITY

## 2021-01-03 RX ORDER — CELECOXIB 200 MG/1
200 CAPSULE ORAL DAILY
COMMUNITY

## 2021-01-03 RX ORDER — LISINOPRIL 10 MG/1
10 TABLET ORAL DAILY
COMMUNITY

## 2021-01-03 RX ORDER — CANAGLIFLOZIN AND METFORMIN HYDROCHLORIDE 150; 1000 MG/1; MG/1
1 TABLET, FILM COATED ORAL 2 TIMES DAILY
COMMUNITY

## 2021-01-03 RX ORDER — PREGABALIN 150 MG/1
150 CAPSULE ORAL 2 TIMES DAILY
COMMUNITY

## 2021-01-03 RX ORDER — GLIMEPIRIDE 4 MG/1
4 TABLET ORAL EVERY MORNING
COMMUNITY

## 2021-01-03 RX ORDER — SIMVASTATIN 10 MG
10 TABLET ORAL EVERY EVENING
COMMUNITY

## 2021-01-03 RX ORDER — LEVOTHYROXINE SODIUM 0.07 MG/1
75 TABLET ORAL
COMMUNITY

## 2021-01-03 RX ADMIN — FENTANYL CITRATE 50 MCG: 50 INJECTION, SOLUTION INTRAMUSCULAR; INTRAVENOUS at 01:27

## 2021-01-03 RX ADMIN — FENTANYL CITRATE 50 MCG: 50 INJECTION, SOLUTION INTRAMUSCULAR; INTRAVENOUS at 04:47

## 2021-01-03 RX ADMIN — OXYCODONE 5 MG: 5 TABLET ORAL at 20:51

## 2021-01-03 RX ADMIN — PANTOPRAZOLE SODIUM 40 MG: 40 INJECTION, POWDER, FOR SOLUTION INTRAVENOUS at 06:00

## 2021-01-03 RX ADMIN — SODIUM CHLORIDE, SODIUM LACTATE, POTASSIUM CHLORIDE, CALCIUM CHLORIDE AND DEXTROSE MONOHYDRATE: 5; 600; 310; 30; 20 INJECTION, SOLUTION INTRAVENOUS at 02:47

## 2021-01-03 RX ADMIN — PANTOPRAZOLE SODIUM 40 MG: 40 INJECTION, POWDER, FOR SOLUTION INTRAVENOUS at 18:35

## 2021-01-03 ASSESSMENT — PAIN DESCRIPTION - PAIN TYPE
TYPE: SURGICAL PAIN

## 2021-01-03 NOTE — PROGRESS NOTES
0705- Report received from [facility] RN. Patient resting in bed, no acute needs at this time.    0800- Assessment complete. Patient is a little disoriented. Able to ambulate up to BS with SB assist.     1100- Per Dr. Romero patient is cleared for a clear liquid diet.     1600- Patient incredibly hungry and requesting more food. Per Dr. Romero pt of to have a full liquid diet for dinner.

## 2021-01-03 NOTE — ASSESSMENT & PLAN NOTE
Status post Valentin Patch by Dr. Romero on 12/30 and was cleared for a clear liquid diet on 1/3  She has been on an IV protonix drip transition to prilosec on 1/4  Oral oxycodone ordered   IV fentanyl for breakthrough pain  Lovenox prophylactic dose to start 1/4

## 2021-01-03 NOTE — PROGRESS NOTES
Critical care progress note:  There is no critical care management currently.  Weaned off of pressors.  Transfer to [facility] medical floor, post operative for bowel perforation.  Discussed with hospitalist Dr Montes.

## 2021-01-03 NOTE — ASSESSMENT & PLAN NOTE
HbA1c is 5.9  She is on Glimepiride 4 mg, Dulaglutide (GLP-1 agonist), Canagliflozin (SGLT2 inhibitor) 150 mg BID, and metformin 1,000 mg BID outpatient  Sliding scale insulin

## 2021-01-03 NOTE — DIETARY
"Nutrition services: Day 4 of admit.  Maria Hamman is a 50 y.o. female with admitting DX of esophageal tear.    Consult received for NPO/Clear liquid diet x 3 days in the ICU.     Assessment:  Height: 170.2 cm (5' 7\")  Weight: (!) 156.5 kg (345 lb 0.3 oz)  Body mass index is 54.04 kg/m²., BMI classification: obese class III  Diet/Intake: Clear liquid    Evaluation:   1. Admitted with abdominal pain.  2. Transferred from outside facility, found to be COVID+ with esophageal tear.  3. POD#4 ex lap and oversew of perforated GJ ulcer.  4. Pt with BMI >40 (=Body mass index is 54.04 kg/m².), morbid obesity. Weight loss counseling not appropriate in acute care setting.   5. Hx of T2DM; remote hx of Jf-en-Y   6. NGT removed, diet advanced to clear liquid today; no meals recorded in ADL flow sheet to assess.    Malnutrition Risk: Unable to determine at this time.    Recommendations/Plan:  1. Advance diet as tolerated per MD.  2. Encourage intake of meals.  3. Document intake of all meals as % taken in ADLs to provide interdisciplinary communication across all shifts.   4. Monitor weight.  5. Nutrition rep will continue to see patient for ongoing meal and snack preferences.   6. Add clear liquid oral nutrition supplement (Boost Breeze) as appropriate to optimize intake.    RD will continue to follow.        "

## 2021-01-03 NOTE — CONSULTS
Hospital Medicine Consultation    Date of Service  1/3/2021    Referring Physician  Jefferson Whitmore M.D.    Consulting Physician  Arcenio Montes M.D.    Reason for Consultation  COVID +    History of Presenting Illness  50 y.o. female who presented 12/30/2020 with abdominal pain.  Ms. Hamman has a history of diabetes, hypothyroidism, and remote history of gastric bypass 1990's that developed a fever, cough, and abdominal pain. She went to the free-standing ER and was found to have COVID+ status and CT revealed pneumoperitoneum. She was hypotensive and placed on IV pressors and transferred to [facility]. She underwent exploratory laparotomy with modified Valentin Patch of a perforated marginal ulcer by Dr. Romero and was admitted to the ICU under COVID isolation. She was treated with IV Zosyn and IV fluids.   1/3: patient seen and evaluated in the COVID ICU. She is off of the IV Levophed drip since 1/2. She is on 3 liters nasal cannula oxygen. Wound vac is on. Her RN notes that she received 4 doses of IV fentanyl last night. Dr. Romero has cleared her for clears today. She is quite confused and only able to provide a modest history.  Ms. Hamman has a wound vac.    Review of Systems  Review of Systems   Unable to perform ROS: Mental acuity       Past Medical History  Diabetes, obesity, hypothyroid    Surgical History  Gastric bypass    Family History  She denies known family hx of MI    Social History   she lives in [place] and does not have any children and is not   She admits to MJ use and social EtOH    Medications  RN to call her pharmacy once it is known    Allergies  Not on File    Physical Exam  Temp:  [36.3 °C (97.4 °F)-37.7 °C (99.9 °F)] 37.2 °C (99 °F)  Pulse:  [] 74  Resp:  [14-24] 20  BP: (101-140)/(37-73) 104/73  SpO2:  [87 %-98 %] 95 %    Physical Exam  Vitals signs and nursing note reviewed.   Constitutional:       Appearance: She is obese. She is ill-appearing. She is not  toxic-appearing.   HENT:      Head: Normocephalic and atraumatic.      Mouth/Throat:      Mouth: Mucous membranes are dry.      Pharynx: Oropharynx is clear.   Eyes:      General: No scleral icterus.     Conjunctiva/sclera: Conjunctivae normal.   Neck:      Musculoskeletal: Neck supple.      Comments: Right internal jugular catheter   Cardiovascular:      Rate and Rhythm: Normal rate and regular rhythm.      Comments: Distant heart sounds  Pulmonary:      Effort: Pulmonary effort is normal.      Breath sounds: Normal breath sounds.      Comments: Decreased air movement  Abdominal:      General: There is distension.      Comments: Wound vac in place   Genitourinary:     Comments: Toth catheter  Musculoskeletal:      Right lower leg: Edema present.      Left lower leg: Edema present.   Skin:     General: Skin is warm and dry.      Coloration: Skin is pale.   Neurological:      Mental Status: She is alert.   Psychiatric:      Comments: Awake, confused, cooperative with exam         Fluids      Laboratory  Recent Labs     01/01/21  0238 01/02/21  0500 01/03/21  0500   WBC 10.0 7.3 5.8   RBC 3.91* 3.43* 3.51*   HEMOGLOBIN 11.4* 9.9* 10.3*   HEMATOCRIT 37.9 33.6* 34.4*   MCV 96.9 98.0* 98.0*   MCH 29.2 28.9 29.3   MCHC 30.1* 29.5* 29.9*   RDW 55.4* 55.7* 55.4*   PLATELETCT 176 184 197   MPV 10.6 10.5 10.6     Recent Labs     01/01/21  0238 01/02/21  0500 01/03/21  0500   SODIUM 140 143 146*   POTASSIUM 4.9 4.4 4.0   CHLORIDE 113* 114* 115*   CO2 21 23 21   GLUCOSE 131* 161* 154*   BUN 23* 23* 17   CREATININE 1.30 1.36 1.38   CALCIUM 7.8* 8.1* 8.4*     Recent Labs     12/31/20  0955   INR 1.16*                 Imaging  EC-ECHOCARDIOGRAM LTD W/O CONT   Final Result      DX-CHEST-PORTABLE (1 VIEW)   Final Result         Diffuse groundglass opacities throughout both lungs, in keeping with atypical infection      OUTSIDE IMAGES-CT CHEST   Final Result      echocardiogram:  No prior study is available for comparison.    Technically difficult study with limited views.  Normal left ventricular size, wall thickness and systolic function.  Left ventricular visually estimated ejection fraction is 60-65 %.  No obvious valve abnormality noted.  No pericardial effusion    Assessment/Plan  * Septic shock ([facility])- (present on admission)  Assessment & Plan  This is Septic shock Present on admission  SIRS criteria identified on my evaluation include: Fever, with temperature greater than 101 deg F  Source is perforated ulcer  Presentation includes: Severe sepsis present and persistent hypotension after 30 ml/kg completed.   Despite appropriate fluid resuscitation with crystalloid given per sepsis guidelines, the patient remains hypotensive with systolic blood pressure less than 90 or MAP less than 65  Hemodynamic support was given with additional fluids and she required IV vasopressors   IV antibiotics as appropriate for source of sepsis: Zosyn        Perforated ulcer ([facility])- (present on admission)  Assessment & Plan  Status post Valentin Patch by Dr. Romero on 12/30 and was cleared for a clear liquid diet on 1/3  She has been on an IV protonix drip  Oral oxycodone ordered   IV fentanyl for breakthrough pain    COVID-19- (present on admission)  Assessment & Plan  Isolation precautions     Hypothyroid- (present on admission)  Assessment & Plan  Once home meds are obtained, restart her home regimen orally      DM (diabetes mellitus) ([facility])- (present on admission)  Assessment & Plan  HbA1c is 5.9  She cannot remember her home meds  Her RN will contact family in order to find out her home pharmacy  Sliding scale insulin    LAN (acute kidney injury) ([facility])- (present on admission)  Assessment & Plan  Secondary to severe sepsis  IV fluids and follow urine output    Class 3 severe obesity in adult ([facility])- (present on admission)  Assessment & Plan  BMI 54

## 2021-01-03 NOTE — PROGRESS NOTES
"    DATE: 1/2/2021    Post Operative Day  3 exploratory laparotomy and oversew of perforated GJ ulcer.    Interval Events:  SP ex lap. Off pressors. Extubated but tenuous.    Reporting moderate pain    PHYSICAL EXAMINATION:  Constitutional:     Vital Signs: BP (!) 109/37   Pulse 74   Temp 37.1 °C (98.8 °F) (Tympanic)   Resp (!) 21   Ht 1.702 m (5' 7\")   Wt (!) 170.1 kg (375 lb)   SpO2 96%    General Appearance: The patient is a cooperative woman in mild distress.   Respiratory:   Inspection: Labored respirations and accessory muscle use. Upper airway congestion    .  Cardiovascular:   Inspection: The skin is warm.     Abdomen:   Inspection: Abdominal inspection reveals provena on incision.   Palpation: Palpation is remarkable for moderate tenderness in the generalized region.     Laboratory Values:   Recent Labs     12/31/20 0955 01/01/21 0238 01/02/21  0500   WBC 17.3* 10.0 7.3   RBC 4.23 3.91* 3.43*   HEMOGLOBIN 12.3 11.4* 9.9*   HEMATOCRIT 40.3 37.9 33.6*   MCV 95.3 96.9 98.0*   MCH 29.1 29.2 28.9   MCHC 30.5* 30.1* 29.5*   RDW 54.0* 55.4* 55.7*   PLATELETCT 221 176 184   MPV 10.5 10.6 10.5     Recent Labs     12/31/20 0955 01/01/21 0238 01/02/21  0500   SODIUM 134* 140 143   POTASSIUM 5.1 4.9 4.4   CHLORIDE 111 113* 114*   CO2 18* 21 23   GLUCOSE 203* 131* 161*   BUN 29* 23* 23*   CREATININE 1.81* 1.30 1.36   CALCIUM 8.0* 7.8* 8.1*     Recent Labs     12/30/20 2002 12/31/20 0955 01/01/21  0238   ASTSGOT 23 34 23   ALTSGPT 21 29 27   TBILIRUBIN <0.2 <0.2 <0.2   ALKPHOSPHAT 85 75 66   GLOBULIN 3.3 3.3 3.1   INR  --  1.16*  --      Recent Labs     12/31/20 0955   INR 1.16*        Imaging:   EC-ECHOCARDIOGRAM LTD W/O CONT   Final Result      DX-CHEST-PORTABLE (1 VIEW)   Final Result         Diffuse groundglass opacities throughout both lungs, in keeping with atypical infection      OUTSIDE IMAGES-CT CHEST   Final Result          ASSESSMENT AND PLAN:     Septic shock ([facility])- (present on " admission)  Assessment & Plan  This is Septic shock Present on admission  SIRS criteria identified on my evaluation include: Tachycardia, with heart rate greater than 90 BPM and Leukocytosis, with WBC greater than 12,000  Source is perforated ulcer  Presentation includes: Severe sepsis present and persistent hypotension after 30 ml/kg completed.   Despite appropriate fluid resuscitation with crystalloid given per sepsis guidelines, the patient remains hypotensive with systolic blood pressure less than 90 or MAP less than 65  Hemodynamic support with additional fluids and IV vasopressors as needed to maintain a SBP of 90 or MAP of 65  IV antibiotics as appropriate for source of sepsis  Reassessment: I have reassessed the patient's hemodynamic status      Perforated ulcer ([facility])- (present on admission)  Assessment & Plan  Perforated ulcer at GJ anastamosis  12/31 Ex lap, repair of ulcer w Valentin patch  [facility]      DISPOSITION:   Continue IV ABx and PPI  Continue NPO except meds for now  Will start CLD on 1/3  Pulm toilet  Up as tolerated    Appreciate ICU team's support      ____________________________________     Ari Romero M.D.

## 2021-01-03 NOTE — ASSESSMENT & PLAN NOTE
This is Septic shock Present on admission  SIRS criteria identified on my evaluation include: Fever, with temperature greater than 101 deg F  Source is perforated ulcer  Presentation includes: Severe sepsis present and persistent hypotension after 30 ml/kg completed.   Despite appropriate fluid resuscitation with crystalloid given per sepsis guidelines, the patient remains hypotensive with systolic blood pressure less than 90 or MAP less than 65  Hemodynamic support was given with additional fluids and she required IV vasopressors   IV antibiotics as appropriate for source of sepsis: Zosyn

## 2021-01-04 PROBLEM — G93.40 ENCEPHALOPATHY ACUTE: Status: ACTIVE | Noted: 2021-01-04

## 2021-01-04 PROBLEM — F32.A DEPRESSION: Status: ACTIVE | Noted: 2021-01-04

## 2021-01-04 LAB
ALBUMIN SERPL BCP-MCNC: 2.7 G/DL (ref 3.2–4.9)
ALBUMIN/GLOB SERPL: 0.8 G/DL
ALP SERPL-CCNC: 68 U/L (ref 30–99)
ALT SERPL-CCNC: 60 U/L (ref 2–50)
ANION GAP SERPL CALC-SCNC: 9 MMOL/L (ref 7–16)
AST SERPL-CCNC: 42 U/L (ref 12–45)
BASOPHILS # BLD AUTO: 0.2 % (ref 0–1.8)
BASOPHILS # BLD: 0.01 K/UL (ref 0–0.12)
BILIRUB SERPL-MCNC: 0.2 MG/DL (ref 0.1–1.5)
BUN SERPL-MCNC: 16 MG/DL (ref 8–22)
CALCIUM SERPL-MCNC: 8.6 MG/DL (ref 8.5–10.5)
CHLORIDE SERPL-SCNC: 114 MMOL/L (ref 96–112)
CO2 SERPL-SCNC: 22 MMOL/L (ref 20–33)
CREAT SERPL-MCNC: 1.33 MG/DL (ref 0.5–1.4)
EOSINOPHIL # BLD AUTO: 0.02 K/UL (ref 0–0.51)
EOSINOPHIL NFR BLD: 0.5 % (ref 0–6.9)
ERYTHROCYTE [DISTWIDTH] IN BLOOD BY AUTOMATED COUNT: 55.3 FL (ref 35.9–50)
GLOBULIN SER CALC-MCNC: 3.6 G/DL (ref 1.9–3.5)
GLUCOSE BLD-MCNC: 134 MG/DL (ref 65–99)
GLUCOSE BLD-MCNC: 139 MG/DL (ref 65–99)
GLUCOSE BLD-MCNC: 160 MG/DL (ref 65–99)
GLUCOSE BLD-MCNC: 174 MG/DL (ref 65–99)
GLUCOSE BLD-MCNC: 177 MG/DL (ref 65–99)
GLUCOSE SERPL-MCNC: 144 MG/DL (ref 65–99)
HCT VFR BLD AUTO: 34.4 % (ref 37–47)
HGB BLD-MCNC: 10.2 G/DL (ref 12–16)
IMM GRANULOCYTES # BLD AUTO: 0.03 K/UL (ref 0–0.11)
IMM GRANULOCYTES NFR BLD AUTO: 0.7 % (ref 0–0.9)
LACTATE BLD-SCNC: 0.9 MMOL/L (ref 0.5–2)
LYMPHOCYTES # BLD AUTO: 1.16 K/UL (ref 1–4.8)
LYMPHOCYTES NFR BLD: 26.5 % (ref 22–41)
MAGNESIUM SERPL-MCNC: 2 MG/DL (ref 1.5–2.5)
MCH RBC QN AUTO: 29.1 PG (ref 27–33)
MCHC RBC AUTO-ENTMCNC: 29.7 G/DL (ref 33.6–35)
MCV RBC AUTO: 98.3 FL (ref 81.4–97.8)
MONOCYTES # BLD AUTO: 0.23 K/UL (ref 0–0.85)
MONOCYTES NFR BLD AUTO: 5.3 % (ref 0–13.4)
NEUTROPHILS # BLD AUTO: 2.93 K/UL (ref 2–7.15)
NEUTROPHILS NFR BLD: 66.8 % (ref 44–72)
NRBC # BLD AUTO: 0 K/UL
NRBC BLD-RTO: 0 /100 WBC
PHOSPHATE SERPL-MCNC: 2.5 MG/DL (ref 2.5–4.5)
PLATELET # BLD AUTO: 186 K/UL (ref 164–446)
PMV BLD AUTO: 10.4 FL (ref 9–12.9)
POTASSIUM SERPL-SCNC: 4.2 MMOL/L (ref 3.6–5.5)
PROT SERPL-MCNC: 6.3 G/DL (ref 6–8.2)
RBC # BLD AUTO: 3.5 M/UL (ref 4.2–5.4)
SODIUM SERPL-SCNC: 145 MMOL/L (ref 135–145)
WBC # BLD AUTO: 4.4 K/UL (ref 4.8–10.8)

## 2021-01-04 PROCEDURE — 700102 HCHG RX REV CODE 250 W/ 637 OVERRIDE(OP): Performed by: INTERNAL MEDICINE

## 2021-01-04 PROCEDURE — 85025 COMPLETE CBC W/AUTO DIFF WBC: CPT

## 2021-01-04 PROCEDURE — 82962 GLUCOSE BLOOD TEST: CPT | Mod: 91

## 2021-01-04 PROCEDURE — 99233 SBSQ HOSP IP/OBS HIGH 50: CPT | Performed by: HOSPITALIST

## 2021-01-04 PROCEDURE — 83735 ASSAY OF MAGNESIUM: CPT

## 2021-01-04 PROCEDURE — 700102 HCHG RX REV CODE 250 W/ 637 OVERRIDE(OP): Performed by: HOSPITALIST

## 2021-01-04 PROCEDURE — 80053 COMPREHEN METABOLIC PANEL: CPT

## 2021-01-04 PROCEDURE — 700111 HCHG RX REV CODE 636 W/ 250 OVERRIDE (IP): Performed by: HOSPITALIST

## 2021-01-04 PROCEDURE — 770021 HCHG ROOM/CARE - ISO PRIVATE

## 2021-01-04 PROCEDURE — C9113 INJ PANTOPRAZOLE SODIUM, VIA: HCPCS | Performed by: INTERNAL MEDICINE

## 2021-01-04 PROCEDURE — A9270 NON-COVERED ITEM OR SERVICE: HCPCS | Performed by: INTERNAL MEDICINE

## 2021-01-04 PROCEDURE — 700111 HCHG RX REV CODE 636 W/ 250 OVERRIDE (IP): Performed by: INTERNAL MEDICINE

## 2021-01-04 PROCEDURE — 83605 ASSAY OF LACTIC ACID: CPT

## 2021-01-04 PROCEDURE — 84100 ASSAY OF PHOSPHORUS: CPT

## 2021-01-04 PROCEDURE — A9270 NON-COVERED ITEM OR SERVICE: HCPCS | Performed by: HOSPITALIST

## 2021-01-04 RX ORDER — ARIPIPRAZOLE 10 MG/1
10 TABLET ORAL DAILY
Status: DISCONTINUED | OUTPATIENT
Start: 2021-01-04 | End: 2021-01-07 | Stop reason: HOSPADM

## 2021-01-04 RX ORDER — AMOXICILLIN 250 MG
2 CAPSULE ORAL 2 TIMES DAILY
Status: DISCONTINUED | OUTPATIENT
Start: 2021-01-04 | End: 2021-01-07 | Stop reason: HOSPADM

## 2021-01-04 RX ORDER — BISACODYL 10 MG
10 SUPPOSITORY, RECTAL RECTAL
Status: DISCONTINUED | OUTPATIENT
Start: 2021-01-04 | End: 2021-01-07 | Stop reason: HOSPADM

## 2021-01-04 RX ORDER — LEVOTHYROXINE SODIUM 0.07 MG/1
75 TABLET ORAL
Status: DISCONTINUED | OUTPATIENT
Start: 2021-01-04 | End: 2021-01-07 | Stop reason: HOSPADM

## 2021-01-04 RX ORDER — POLYETHYLENE GLYCOL 3350 17 G/17G
1 POWDER, FOR SOLUTION ORAL
Status: DISCONTINUED | OUTPATIENT
Start: 2021-01-04 | End: 2021-01-07 | Stop reason: HOSPADM

## 2021-01-04 RX ORDER — OMEPRAZOLE 20 MG/1
20 CAPSULE, DELAYED RELEASE ORAL 2 TIMES DAILY
Status: DISCONTINUED | OUTPATIENT
Start: 2021-01-04 | End: 2021-01-07 | Stop reason: HOSPADM

## 2021-01-04 RX ORDER — VENLAFAXINE 75 MG/1
75 TABLET ORAL 2 TIMES DAILY
Status: DISCONTINUED | OUTPATIENT
Start: 2021-01-04 | End: 2021-01-07 | Stop reason: HOSPADM

## 2021-01-04 RX ORDER — PREGABALIN 150 MG/1
150 CAPSULE ORAL 2 TIMES DAILY
Status: DISCONTINUED | OUTPATIENT
Start: 2021-01-04 | End: 2021-01-07 | Stop reason: HOSPADM

## 2021-01-04 RX ADMIN — OMEPRAZOLE 20 MG: 20 CAPSULE, DELAYED RELEASE ORAL at 17:32

## 2021-01-04 RX ADMIN — PREGABALIN 150 MG: 150 CAPSULE ORAL at 08:13

## 2021-01-04 RX ADMIN — PANTOPRAZOLE SODIUM 40 MG: 40 INJECTION, POWDER, FOR SOLUTION INTRAVENOUS at 05:45

## 2021-01-04 RX ADMIN — INSULIN LISPRO 3 UNITS: 100 INJECTION, SOLUTION INTRAVENOUS; SUBCUTANEOUS at 17:39

## 2021-01-04 RX ADMIN — LEVOTHYROXINE SODIUM 75 MCG: 0.07 TABLET ORAL at 08:13

## 2021-01-04 RX ADMIN — VENLAFAXINE 75 MG: 75 TABLET ORAL at 11:16

## 2021-01-04 RX ADMIN — DOCUSATE SODIUM 50 MG AND SENNOSIDES 8.6 MG 2 TABLET: 8.6; 5 TABLET, FILM COATED ORAL at 17:33

## 2021-01-04 RX ADMIN — ARIPIPRAZOLE 10 MG: 10 TABLET ORAL at 08:13

## 2021-01-04 RX ADMIN — PREGABALIN 150 MG: 150 CAPSULE ORAL at 20:42

## 2021-01-04 RX ADMIN — OXYCODONE 10 MG: 5 TABLET ORAL at 09:04

## 2021-01-04 RX ADMIN — INSULIN LISPRO 3 UNITS: 100 INJECTION, SOLUTION INTRAVENOUS; SUBCUTANEOUS at 13:57

## 2021-01-04 RX ADMIN — ENOXAPARIN SODIUM 60 MG: 60 INJECTION SUBCUTANEOUS at 14:52

## 2021-01-04 RX ADMIN — VENLAFAXINE 75 MG: 75 TABLET ORAL at 17:32

## 2021-01-04 RX ADMIN — OXYCODONE 5 MG: 5 TABLET ORAL at 20:41

## 2021-01-04 RX ADMIN — DOCUSATE SODIUM 50 MG AND SENNOSIDES 8.6 MG 2 TABLET: 8.6; 5 TABLET, FILM COATED ORAL at 05:44

## 2021-01-04 ASSESSMENT — ENCOUNTER SYMPTOMS
ROS GI COMMENTS: ANXIOUS TO EAT
COUGH: 1
FEVER: 0
SHORTNESS OF BREATH: 1

## 2021-01-04 ASSESSMENT — PAIN DESCRIPTION - PAIN TYPE
TYPE: ACUTE PAIN
TYPE: ACUTE PAIN

## 2021-01-04 NOTE — PROGRESS NOTES
Hospital Medicine Daily Progress Note    Date of Service  1/4/2021    Chief Complaint  50 y.o. female admitted 12/30/2020 with abdominal pain    Hospital Course  Ms. Hamman has a history of diabetes, hypothyroidism, and remote history of gastric bypass 1990's that developed a fever, cough, and abdominal pain. She went to the free-standing ER and was found to have COVID+ status and CT revealed pneumoperitoneum. She was hypotensive and placed on IV pressors and transferred to [facility]. She underwent exploratory laparotomy with modified Valentin Patch of a perforated marginal ulcer by Dr. Romero and was admitted to the ICU under COVID isolation. She was treated with IV Zosyn and IV fluids.       Interval Problem Update  1/3: patient seen and evaluated in the COVID ICU. She is off of the IV Levophed drip since 1/2. She is on 3 liters nasal cannula oxygen. Wound vac is on. Her RN notes that she received 4 doses of IV fentanyl last night. Dr. Romero has cleared her for clears today. She is quite confused and only able to provide a modest history.  Ms. Hamman has a wound vac.  1/4: Ms. Hamman was evaluated and examined in the ICU. I discussed with Dr. Romero about prophylactic lovenox and he has authorized starting today as well as changing IV protonix to prilosec. She is on 8 liters nasal cannula oxygen. Ms. Hamman is awake, alert, and oriented x 4. She is tolerating a clear liquid diet. Her home meds were reviewed and her antidepressants and synthroid were restarted.   Consultants/Specialty  [facility] surgery  Critical care     Code Status  Full Code    Disposition  Surgery     Review of Systems  Review of Systems   Constitutional: Negative for fever.   Respiratory: Positive for cough and shortness of breath.    Cardiovascular: Negative for chest pain.   Gastrointestinal:        Anxious to eat   Neurological:        Generally weak but improving   All other systems reviewed and are negative.       Physical  Exam  Temp:  [37.2 °C (98.9 °F)-37.2 °C (99 °F)] 37.2 °C (98.9 °F)  Pulse:  [] 86  Resp:  [20-22] 20  BP: (100-124)/(41-68) 100/41  SpO2:  [76 %-100 %] 97 %    Physical Exam  Vitals signs and nursing note reviewed.   Constitutional:       Appearance: She is obese. She is not toxic-appearing.   HENT:      Head: Normocephalic and atraumatic.      Mouth/Throat:      Mouth: Mucous membranes are dry.      Pharynx: Oropharynx is clear.   Neck:      Comments: Right IJ central line  Cardiovascular:      Rate and Rhythm: Normal rate and regular rhythm.   Pulmonary:      Comments: Nasal cannula oxygen  Normal work of breathing  Somewhat diminished lung sounds throughout  Abdominal:      General: There is distension.      Palpations: Abdomen is soft.      Comments: Wound vac anteriorly    Musculoskeletal:      Right lower leg: Edema present.      Left lower leg: Edema present.   Skin:     General: Skin is warm and dry.   Neurological:      General: No focal deficit present.      Mental Status: She is alert and oriented to person, place, and time.   Psychiatric:         Mood and Affect: Mood normal.         Behavior: Behavior normal.         Thought Content: Thought content normal.         Fluids    Intake/Output Summary (Last 24 hours) at 1/4/2021 0728  Last data filed at 1/4/2021 0600  Gross per 24 hour   Intake 1425 ml   Output 550 ml   Net 875 ml       Laboratory  Recent Labs     01/02/21  0500 01/03/21  0500 01/04/21  0436   WBC 7.3 5.8 4.4*   RBC 3.43* 3.51* 3.50*   HEMOGLOBIN 9.9* 10.3* 10.2*   HEMATOCRIT 33.6* 34.4* 34.4*   MCV 98.0* 98.0* 98.3*   MCH 28.9 29.3 29.1   MCHC 29.5* 29.9* 29.7*   RDW 55.7* 55.4* 55.3*   PLATELETCT 184 197 186   MPV 10.5 10.6 10.4     Recent Labs     01/02/21  0500 01/03/21  0500 01/04/21  0436   SODIUM 143 146* 145   POTASSIUM 4.4 4.0 4.2   CHLORIDE 114* 115* 114*   CO2 23 21 22   GLUCOSE 161* 154* 144*   BUN 23* 17 16   CREATININE 1.36 1.38 1.33   CALCIUM 8.1* 8.4* 8.6                    Imaging  EC-ECHOCARDIOGRAM LTD W/O CONT   Final Result      DX-CHEST-PORTABLE (1 VIEW)   Final Result         Diffuse groundglass opacities throughout both lungs, in keeping with atypical infection      OUTSIDE IMAGES-CT CHEST   Final Result           Assessment/Plan  * Septic shock (HCC)- (present on admission)  Assessment & Plan  This is Septic shock Present on admission  SIRS criteria identified on my evaluation include: Fever, with temperature greater than 101 deg F  Source is perforated ulcer  Presentation includes: Severe sepsis present and persistent hypotension after 30 ml/kg completed.   Despite appropriate fluid resuscitation with crystalloid given per sepsis guidelines, the patient remains hypotensive with systolic blood pressure less than 90 or MAP less than 65  Hemodynamic support was given with additional fluids and she required IV vasopressors   IV antibiotics as appropriate for source of sepsis: Zosyn        Perforated ulcer (HCC)- (present on admission)  Assessment & Plan  Status post Valentin Patch by Dr. Romero on 12/30 and was cleared for a clear liquid diet on 1/3  She has been on an IV protonix drip transition to prilosec on 1/4  Oral oxycodone ordered   IV fentanyl for breakthrough pain  Lovenox prophylactic dose to start 1/4    COVID-19- (present on admission)  Assessment & Plan  Isolation precautions   Supplemental oxygen    Depression- (present on admission)  Assessment & Plan  She has a history of depression in remission.  Restart home Abilify and Effexor QTC is 400    Encephalopathy acute- (present on admission)  Assessment & Plan  Acute metabolic encephalopathy secondary to sepsis and COVID    Hypothyroid- (present on admission)  Assessment & Plan  Restart home synthroid 75 mcg daily    DM (diabetes mellitus) (HCC)- (present on admission)  Assessment & Plan  HbA1c is 5.9  She is on Glimepiride 4 mg, Dulaglutide (GLP-1 agonist), Canagliflozin (SGLT2 inhibitor) 150 mg BID, and metformin  1,000 mg BID outpatient  Sliding scale insulin    LAN (acute kidney injury) (HCC)- (present on admission)  Assessment & Plan  Secondary to severe sepsis  IV fluids and follow urine output    Class 3 severe obesity in adult (HCC)- (present on admission)  Assessment & Plan  BMI 54       VTE prophylaxis: SCDs and lovenox

## 2021-01-04 NOTE — PROGRESS NOTES
"    DATE: 1/3/2021    Post Operative Day 4 exploratory laparotomy with modified Valentin patch of perforated marginal ulcer.    Interval Events:  Alert, conversant, reporting no pain, tolerating clear liquids, no nausea or vomiting, at least 1 bowel movement, she has been up to the commode    PHYSICAL EXAMINATION:  Constitutional:     Vital Signs: /68   Pulse (!) 104   Temp 37.2 °C (98.9 °F) (Temporal)   Resp (!) 22   Ht 1.702 m (5' 7\")   Wt (!) 156.5 kg (345 lb 0.3 oz)   SpO2 95%     General: Alert conversant, no distress  Abdomen: Soft, mildly distended mildly tender, no rebound or guarding, Prevena CDI  Extremities: Mild peripheral edema     Laboratory Values:   Recent Labs     01/01/21  0238 01/02/21  0500 01/03/21  0500   WBC 10.0 7.3 5.8   RBC 3.91* 3.43* 3.51*   HEMOGLOBIN 11.4* 9.9* 10.3*   HEMATOCRIT 37.9 33.6* 34.4*   MCV 96.9 98.0* 98.0*   MCH 29.2 28.9 29.3   MCHC 30.1* 29.5* 29.9*   RDW 55.4* 55.7* 55.4*   PLATELETCT 176 184 197   MPV 10.6 10.5 10.6     Recent Labs     01/01/21  0238 01/02/21  0500 01/03/21  0500   SODIUM 140 143 146*   POTASSIUM 4.9 4.4 4.0   CHLORIDE 113* 114* 115*   CO2 21 23 21   GLUCOSE 131* 161* 154*   BUN 23* 23* 17   CREATININE 1.30 1.36 1.38   CALCIUM 7.8* 8.1* 8.4*     Recent Labs     01/01/21  0238 01/03/21  0500   ASTSGOT 23 41   ALTSGPT 27 53*   TBILIRUBIN <0.2 0.2   ALKPHOSPHAT 66 76   GLOBULIN 3.1 3.4            Imaging:   EC-ECHOCARDIOGRAM LTD W/O CONT   Final Result      DX-CHEST-PORTABLE (1 VIEW)   Final Result         Diffuse groundglass opacities throughout both lungs, in keeping with atypical infection      OUTSIDE IMAGES-CT CHEST   Final Result          ASSESSMENT AND PLAN:     * Septic shock (HCC)- (present on admission)  Assessment & Plan  This is Septic shock Present on admission  SIRS criteria identified on my evaluation include: Tachycardia, with heart rate greater than 90 BPM and Leukocytosis, with WBC greater than 12,000  Source is perforated " ulcer  Presentation includes: Severe sepsis present and persistent hypotension after 30 ml/kg completed.   Despite appropriate fluid resuscitation with crystalloid given per sepsis guidelines, the patient remains hypotensive with systolic blood pressure less than 90 or MAP less than 65  Hemodynamic support with additional fluids and IV vasopressors as needed to maintain a SBP of 90 or MAP of 65  IV antibiotics as appropriate for source of sepsis  Reassessment: I have reassessed the patient's hemodynamic status      Perforated ulcer (HCC)- (present on admission)  Assessment & Plan  Perforated ulcer at GJ anastamosis  12/31 Ex lap, repair of ulcer w Valentin patch  [facility]      DISPOSITION:   Continue IV ABx and PPI  Advance to full liquids  Pulm toilet  Up as tolerated  Remove or replace prevena on POD 7    Appreciate ICU team's support      ____________________________________     Ari Romero M.D.

## 2021-01-04 NOTE — PROGRESS NOTES
Assumed care of patient, pt A/O x 4 , able to verbalize own needs Respirations even and unlabored on 02 at 6lpm/nc Pt has midline abd incision with previna wound vac in place , Denies needs or c/os Call bell in reach.

## 2021-01-05 PROBLEM — J96.01 ACUTE RESPIRATORY FAILURE WITH HYPOXIA (HCC): Status: ACTIVE | Noted: 2021-01-05

## 2021-01-05 LAB
BACTERIA BLD CULT: NORMAL
BACTERIA BLD CULT: NORMAL
GLUCOSE BLD-MCNC: 120 MG/DL (ref 65–99)
GLUCOSE BLD-MCNC: 135 MG/DL (ref 65–99)
GLUCOSE BLD-MCNC: 173 MG/DL (ref 65–99)
SIGNIFICANT IND 70042: NORMAL
SIGNIFICANT IND 70042: NORMAL
SITE SITE: NORMAL
SITE SITE: NORMAL
SOURCE SOURCE: NORMAL
SOURCE SOURCE: NORMAL

## 2021-01-05 PROCEDURE — 700111 HCHG RX REV CODE 636 W/ 250 OVERRIDE (IP): Performed by: HOSPITALIST

## 2021-01-05 PROCEDURE — A9270 NON-COVERED ITEM OR SERVICE: HCPCS | Performed by: HOSPITALIST

## 2021-01-05 PROCEDURE — 82962 GLUCOSE BLOOD TEST: CPT | Mod: 91

## 2021-01-05 PROCEDURE — 99233 SBSQ HOSP IP/OBS HIGH 50: CPT | Performed by: INTERNAL MEDICINE

## 2021-01-05 PROCEDURE — 700102 HCHG RX REV CODE 250 W/ 637 OVERRIDE(OP): Performed by: HOSPITALIST

## 2021-01-05 PROCEDURE — 770021 HCHG ROOM/CARE - ISO PRIVATE

## 2021-01-05 RX ADMIN — OMEPRAZOLE 20 MG: 20 CAPSULE, DELAYED RELEASE ORAL at 17:37

## 2021-01-05 RX ADMIN — PREGABALIN 150 MG: 150 CAPSULE ORAL at 08:47

## 2021-01-05 RX ADMIN — LEVOTHYROXINE SODIUM 75 MCG: 0.07 TABLET ORAL at 05:19

## 2021-01-05 RX ADMIN — VENLAFAXINE 75 MG: 75 TABLET ORAL at 17:37

## 2021-01-05 RX ADMIN — ENOXAPARIN SODIUM 60 MG: 60 INJECTION SUBCUTANEOUS at 17:37

## 2021-01-05 RX ADMIN — PREGABALIN 150 MG: 150 CAPSULE ORAL at 20:15

## 2021-01-05 RX ADMIN — VENLAFAXINE 75 MG: 75 TABLET ORAL at 05:28

## 2021-01-05 RX ADMIN — INSULIN LISPRO 3 UNITS: 100 INJECTION, SOLUTION INTRAVENOUS; SUBCUTANEOUS at 00:07

## 2021-01-05 RX ADMIN — ARIPIPRAZOLE 10 MG: 10 TABLET ORAL at 05:19

## 2021-01-05 RX ADMIN — OMEPRAZOLE 20 MG: 20 CAPSULE, DELAYED RELEASE ORAL at 05:20

## 2021-01-05 RX ADMIN — ENOXAPARIN SODIUM 60 MG: 60 INJECTION SUBCUTANEOUS at 03:17

## 2021-01-05 RX ADMIN — INSULIN LISPRO 3 UNITS: 100 INJECTION, SOLUTION INTRAVENOUS; SUBCUTANEOUS at 12:45

## 2021-01-05 RX ADMIN — DOCUSATE SODIUM 50 MG AND SENNOSIDES 8.6 MG 2 TABLET: 8.6; 5 TABLET, FILM COATED ORAL at 05:19

## 2021-01-05 RX ADMIN — DOCUSATE SODIUM 50 MG AND SENNOSIDES 8.6 MG 2 TABLET: 8.6; 5 TABLET, FILM COATED ORAL at 17:37

## 2021-01-05 ASSESSMENT — ENCOUNTER SYMPTOMS
ROS GI COMMENTS: ANXIOUS TO EAT
COUGH: 1
SHORTNESS OF BREATH: 1
FEVER: 0

## 2021-01-05 NOTE — PROGRESS NOTES
[facility] Medicine Daily Progress Note    Date of Service  1/5/2021    Chief Complaint  50 y.o. female admitted 12/30/2020 with abdominal pain    Hospital Course  Ms. Hamman has a history of diabetes, hypothyroidism, and remote history of gastric bypass 1990's that developed a fever, cough, and abdominal pain. She went to the free-standing ER and was found to have COVID+ status and CT revealed pneumoperitoneum. She was hypotensive and placed on IV pressors and transferred to [facility]. She underwent exploratory laparotomy with modified Valentin Patch of a perforated marginal ulcer by Dr. Romero and was admitted to the ICU under COVID isolation. She was treated with IV Zosyn and IV fluids.       Interval Problem Update  1/3: patient seen and evaluated in the COVID ICU. She is off of the IV Levophed drip since 1/2. She is on 3 liters nasal cannula oxygen. Wound vac is on. Her RN notes that she received 4 doses of IV fentanyl last night. Dr. Romero has cleared her for clears today. She is quite confused and only able to provide a modest history.  Ms. Hamman has a wound vac.  1/4: Ms. Hamman was evaluated and examined in the ICU. I discussed with Dr. Romero about prophylactic lovenox and he has authorized starting today as well as changing IV protonix to prilosec. She is on 8 liters nasal cannula oxygen. Ms. Hamman is awake, alert, and oriented x 4. She is tolerating a clear liquid diet. Her home meds were reviewed and her antidepressants and synthroid were restarted.   1/5 Patient seen and examined at bedside. She is laying comfortably in bed in no distress. She is currently on 3L of O2 via nasal canula. She has been able to get her self up and ambulate to the chair with assistance. She reports some pain of her surgical site with deep breathing or coughing. I have encouraged her to use the incentive spirometer. She has been tolerating full liquid diet. PT OT     Consultants/Specialty  [facility]  surgery  Critical care     Code Status  Full Code    Disposition  Transfer to med/surg    Review of Systems  Review of Systems   Constitutional: Negative for fever.   Respiratory: Positive for cough and shortness of breath.    Cardiovascular: Negative for chest pain.   Gastrointestinal:        Anxious to eat   Neurological:        Generally weak but improving   All other systems reviewed and are negative.       Physical Exam  Temp:  [37 °C (98.6 °F)-37.2 °C (98.9 °F)] 37 °C (98.6 °F)  Pulse:  [75-91] 80  Resp:  [20] 20  BP: ()/(48-59) 122/59  SpO2:  [96 %-100 %] 100 %    Physical Exam  Vitals signs and nursing note reviewed.   Constitutional:       Appearance: She is obese. She is not toxic-appearing.   HENT:      Head: Normocephalic and atraumatic.      Mouth/Throat:      Mouth: Mucous membranes are dry.      Pharynx: Oropharynx is clear.   Neck:      Comments: Right IJ central line  Cardiovascular:      Rate and Rhythm: Normal rate and regular rhythm.   Pulmonary:      Comments: Nasal cannula oxygen  Normal work of breathing  Somewhat diminished lung sounds throughout  Abdominal:      General: There is distension.      Palpations: Abdomen is soft.      Comments: Wound vac anteriorly    Musculoskeletal:      Right lower leg: Edema present.      Left lower leg: Edema present.   Skin:     General: Skin is warm and dry.   Neurological:      General: No focal deficit present.      Mental Status: She is alert and oriented to person, place, and time.   Psychiatric:         Mood and Affect: Mood normal.         Behavior: Behavior normal.         Thought Content: Thought content normal.         Fluids    Intake/Output Summary (Last 24 hours) at 1/5/2021 1030  Last data filed at 1/4/2021 1500  Gross per 24 hour   Intake 600 ml   Output --   Net 600 ml       Laboratory  Recent Labs     01/03/21  0500 01/04/21  0436   WBC 5.8 4.4*   RBC 3.51* 3.50*   HEMOGLOBIN 10.3* 10.2*   HEMATOCRIT 34.4* 34.4*   MCV 98.0* 98.3*    MCH 29.3 29.1   MCHC 29.9* 29.7*   RDW 55.4* 55.3*   PLATELETCT 197 186   MPV 10.6 10.4     Recent Labs     01/03/21  0500 01/04/21  0436   SODIUM 146* 145   POTASSIUM 4.0 4.2   CHLORIDE 115* 114*   CO2 21 22   GLUCOSE 154* 144*   BUN 17 16   CREATININE 1.38 1.33   CALCIUM 8.4* 8.6                   Imaging  EC-ECHOCARDIOGRAM LTD W/O CONT   Final Result      DX-CHEST-PORTABLE (1 VIEW)   Final Result         Diffuse groundglass opacities throughout both lungs, in keeping with atypical infection      OUTSIDE IMAGES-CT CHEST   Final Result           Assessment/Plan  * Septic shock (HCC)- (present on admission)  Assessment & Plan  This is Septic shock Present on admission  SIRS criteria identified on my evaluation include: Fever, with temperature greater than 101 deg F  Source is perforated ulcer  Presentation includes: Severe sepsis present and persistent hypotension after 30 ml/kg completed.   Despite appropriate fluid resuscitation with crystalloid given per sepsis guidelines, the patient remains hypotensive with systolic blood pressure less than 90 or MAP less than 65  Hemodynamic support was given with additional fluids and she required IV vasopressors   IV antibiotics as appropriate for source of sepsis: Zosyn        Perforated ulcer (HCC)- (present on admission)  Assessment & Plan  Status post Valentin Patch by Dr. Romero on 12/30 and was cleared for a clear liquid diet on 1/3  She has been on an IV protonix drip transition to prilosec on 1/4  Oral oxycodone ordered   IV fentanyl for breakthrough pain  Lovenox prophylactic dose to start 1/4    COVID-19- (present on admission)  Assessment & Plan  Isolation precautions   Supplemental oxygen  If patient has worsening oxygen requirements we will consider starting decadron    Acute respiratory failure with hypoxia (HCC)  Assessment & Plan  In the setting of Covid 19 infection and recent surgical repair of perforated ulcer  Currently on 3L of oxygen  Encouraged IS use  and mobilization      Depression- (present on admission)  Assessment & Plan  She has a history of depression in remission.  Restart home Abilify and Effexor QTC is 400    Encephalopathy acute- (present on admission)  Assessment & Plan  Acute metabolic encephalopathy secondary to sepsis and COVID    Hypothyroid- (present on admission)  Assessment & Plan  Restart home synthroid 75 mcg daily    DM (diabetes mellitus) ([facility])- (present on admission)  Assessment & Plan  HbA1c is 5.9  She is on Glimepiride 4 mg, Dulaglutide (GLP-1 agonist), Canagliflozin (SGLT2 inhibitor) 150 mg BID, and metformin 1,000 mg BID outpatient  Sliding scale insulin    LAN (acute kidney injury) ([facility])- (present on admission)  Assessment & Plan  Secondary to severe sepsis  IV fluids and follow urine output    Class 3 severe obesity in adult ([facility])- (present on admission)  Assessment & Plan  BMI 54       VTE prophylaxis: SCDs and lovenox

## 2021-01-05 NOTE — PROGRESS NOTES
"    DATE: 1/5/2021    Post Operative Day 6 exploratory laparotomy with modified Valentin patch of perforated marginal ulcer.    PHYSICAL EXAMINATION:  Vital Signs: BP (!) 99/59   Pulse 100   Temp 37.2 °C (99 °F)   Resp 20   Ht 1.702 m (5' 7\")   Wt (!) 156.5 kg (345 lb 0.3 oz)   SpO2 94%     Laboratory Values:   Recent Labs     01/03/21  0500 01/04/21  0436   WBC 5.8 4.4*   RBC 3.51* 3.50*   HEMOGLOBIN 10.3* 10.2*   HEMATOCRIT 34.4* 34.4*   MCV 98.0* 98.3*   MCH 29.3 29.1   MCHC 29.9* 29.7*   RDW 55.4* 55.3*   PLATELETCT 197 186   MPV 10.6 10.4     Recent Labs     01/03/21  0500 01/04/21  0436   SODIUM 146* 145   POTASSIUM 4.0 4.2   CHLORIDE 115* 114*   CO2 21 22   GLUCOSE 154* 144*   BUN 17 16   CREATININE 1.38 1.33   CALCIUM 8.4* 8.6     Recent Labs     01/03/21  0500 01/04/21  0436   ASTSGOT 41 42   ALTSGPT 53* 60*   TBILIRUBIN 0.2 0.2   ALKPHOSPHAT 76 68   GLOBULIN 3.4 3.6*            ASSESSMENT AND PLAN:     Continued progress.  Advance diet as tolerated.  Mobilize aggressively.  May shower.  No lifting greater than 25 pounds.  Follow up with Dr Romero.     ____________________________________     Kenny Russell M.D.    DD: 1/5/2021  11:46 AM      "

## 2021-01-05 NOTE — ASSESSMENT & PLAN NOTE
In the setting of Covid 19 infection and recent surgical repair of perforated ulcer  Currently on 3L of oxygen  Encouraged IS use and mobilization

## 2021-01-05 NOTE — PROGRESS NOTES
No changes noted during shift Pt denies needs or c/os Will give report to oncoming shift Call bell in reach.

## 2021-01-05 NOTE — CARE PLAN
Problem: Nutritional:  Goal: Achieve adequate nutritional intake  Description: Patient will consume 50% of meals  1/5/2021 0914 by Puja Barger R.D.  Outcome: PROGRESSING AS EXPECTED     Pt tolerated clear liquid diet 1/3, advanced to full liquids 1/4.  PO intake 1/4 recorded as % of breakfast, but <25% of lunch and dinner.   Will add Boost supplements to optimize nutrition on liquid diet.     RD will continue to follow.

## 2021-01-06 LAB
GLUCOSE BLD-MCNC: 108 MG/DL (ref 65–99)
GLUCOSE BLD-MCNC: 123 MG/DL (ref 65–99)
GLUCOSE BLD-MCNC: 135 MG/DL (ref 65–99)
GLUCOSE BLD-MCNC: 139 MG/DL (ref 65–99)

## 2021-01-06 PROCEDURE — A9270 NON-COVERED ITEM OR SERVICE: HCPCS | Performed by: HOSPITALIST

## 2021-01-06 PROCEDURE — 99232 SBSQ HOSP IP/OBS MODERATE 35: CPT | Performed by: INTERNAL MEDICINE

## 2021-01-06 PROCEDURE — A9270 NON-COVERED ITEM OR SERVICE: HCPCS | Performed by: INTERNAL MEDICINE

## 2021-01-06 PROCEDURE — 97166 OT EVAL MOD COMPLEX 45 MIN: CPT

## 2021-01-06 PROCEDURE — 770021 HCHG ROOM/CARE - ISO PRIVATE

## 2021-01-06 PROCEDURE — 700111 HCHG RX REV CODE 636 W/ 250 OVERRIDE (IP): Performed by: HOSPITALIST

## 2021-01-06 PROCEDURE — 97161 PT EVAL LOW COMPLEX 20 MIN: CPT

## 2021-01-06 PROCEDURE — 700102 HCHG RX REV CODE 250 W/ 637 OVERRIDE(OP): Performed by: INTERNAL MEDICINE

## 2021-01-06 PROCEDURE — 82962 GLUCOSE BLOOD TEST: CPT

## 2021-01-06 PROCEDURE — 700102 HCHG RX REV CODE 250 W/ 637 OVERRIDE(OP): Performed by: HOSPITALIST

## 2021-01-06 RX ORDER — DEXAMETHASONE 6 MG/1
6 TABLET ORAL DAILY
Status: DISCONTINUED | OUTPATIENT
Start: 2021-01-06 | End: 2021-01-07 | Stop reason: HOSPADM

## 2021-01-06 RX ADMIN — ARIPIPRAZOLE 10 MG: 10 TABLET ORAL at 06:39

## 2021-01-06 RX ADMIN — ENOXAPARIN SODIUM 60 MG: 60 INJECTION SUBCUTANEOUS at 06:38

## 2021-01-06 RX ADMIN — DOCUSATE SODIUM 50 MG AND SENNOSIDES 8.6 MG 2 TABLET: 8.6; 5 TABLET, FILM COATED ORAL at 17:21

## 2021-01-06 RX ADMIN — PREGABALIN 150 MG: 150 CAPSULE ORAL at 20:06

## 2021-01-06 RX ADMIN — DEXAMETHASONE 6 MG: 6 TABLET ORAL at 16:16

## 2021-01-06 RX ADMIN — LEVOTHYROXINE SODIUM 75 MCG: 0.07 TABLET ORAL at 06:39

## 2021-01-06 RX ADMIN — VENLAFAXINE 75 MG: 75 TABLET ORAL at 17:21

## 2021-01-06 RX ADMIN — DOCUSATE SODIUM 50 MG AND SENNOSIDES 8.6 MG 2 TABLET: 8.6; 5 TABLET, FILM COATED ORAL at 06:38

## 2021-01-06 RX ADMIN — OMEPRAZOLE 20 MG: 20 CAPSULE, DELAYED RELEASE ORAL at 06:39

## 2021-01-06 RX ADMIN — ENOXAPARIN SODIUM 60 MG: 60 INJECTION SUBCUTANEOUS at 16:15

## 2021-01-06 RX ADMIN — OMEPRAZOLE 20 MG: 20 CAPSULE, DELAYED RELEASE ORAL at 17:21

## 2021-01-06 RX ADMIN — PREGABALIN 150 MG: 150 CAPSULE ORAL at 12:39

## 2021-01-06 RX ADMIN — VENLAFAXINE 75 MG: 75 TABLET ORAL at 06:39

## 2021-01-06 ASSESSMENT — COGNITIVE AND FUNCTIONAL STATUS - GENERAL
STANDING UP FROM CHAIR USING ARMS: A LITTLE
TURNING FROM BACK TO SIDE WHILE IN FLAT BAD: A LOT
TOILETING: A LITTLE
MOVING TO AND FROM BED TO CHAIR: A LOT
CLIMB 3 TO 5 STEPS WITH RAILING: A LITTLE
MOVING FROM LYING ON BACK TO SITTING ON SIDE OF FLAT BED: A LITTLE
DRESSING REGULAR LOWER BODY CLOTHING: A LITTLE
DAILY ACTIVITIY SCORE: 21
MOBILITY SCORE: 16
HELP NEEDED FOR BATHING: A LITTLE
WALKING IN HOSPITAL ROOM: A LITTLE
SUGGESTED CMS G CODE MODIFIER MOBILITY: CK
SUGGESTED CMS G CODE MODIFIER DAILY ACTIVITY: CJ

## 2021-01-06 ASSESSMENT — ENCOUNTER SYMPTOMS
FEVER: 0
SHORTNESS OF BREATH: 1
ROS GI COMMENTS: ANXIOUS TO EAT
COUGH: 1

## 2021-01-06 ASSESSMENT — ACTIVITIES OF DAILY LIVING (ADL): TOILETING: INDEPENDENT

## 2021-01-06 ASSESSMENT — GAIT ASSESSMENTS
DISTANCE (FEET): 8
GAIT LEVEL OF ASSIST: MINIMAL ASSIST
DEVIATION: INCREASED BASE OF SUPPORT;OTHER (COMMENT)
ASSISTIVE DEVICE: OTHER (COMMENTS)

## 2021-01-06 NOTE — DISCHARGE PLANNING
Received Choice form at 1430  Agency/Facility Name: [facility]  Referral sent per Choice form at 1440    Received Choice form at 1430  Agency/Facility Name: [facility]  Referral sent per Choice form at 1440    1550- pt declined at [facility] / non contracted insurnace    1555- Referral sent per choice to [facility]

## 2021-01-06 NOTE — PROGRESS NOTES
Assumed care of patient, pt A/O x 4 , able to verbalize own needs Respirations even and unlabored on 02 at 3lpm/nc Pt has midline abd incision with previna wound vac in place , Denies needs or c/os Call bell in reach.

## 2021-01-06 NOTE — PROGRESS NOTES
[facility] Medicine Daily Progress Note    Date of Service  1/6/2021    Chief Complaint  50 y.o. female admitted 12/30/2020 with abdominal pain    Hospital Course  Ms. Hamman has a history of diabetes, hypothyroidism, and remote history of gastric bypass 1990's that developed a fever, cough, and abdominal pain. She went to the free-standing ER and was found to have COVID+ status and CT revealed pneumoperitoneum. She was hypotensive and placed on IV pressors and transferred to [facility]. She underwent exploratory laparotomy with modified Valentin Patch of a perforated marginal ulcer by Dr. Romero and was admitted to the ICU under COVID isolation. She was treated with IV Zosyn and IV fluids.       Interval Problem Update  1/3: patient seen and evaluated in the COVID ICU. She is off of the IV Levophed drip since 1/2. She is on 3 liters nasal cannula oxygen. Wound vac is on. Her RN notes that she received 4 doses of IV fentanyl last night. Dr. Romero has cleared her for clears today. She is quite confused and only able to provide a modest history.  Ms. Hamman has a wound vac.  1/4: Ms. Hamman was evaluated and examined in the ICU. I discussed with Dr. Romero about prophylactic lovenox and he has authorized starting today as well as changing IV protonix to prilosec. She is on 8 liters nasal cannula oxygen. Ms. Hamman is awake, alert, and oriented x 4. She is tolerating a clear liquid diet. Her home meds were reviewed and her antidepressants and synthroid were restarted.   1/5 Patient seen and examined at bedside. She is laying comfortably in bed in no distress. She is currently on 3L of O2 via nasal canula. She has been able to get her self up and ambulate to the chair with assistance. She reports some pain of her surgical site with deep breathing or coughing. I have encouraged her to use the incentive spirometer. She has been tolerating full liquid diet. PT OT     1/6 patient states that she worked with PT  OT this morning and was able to ambulate to the chair with one assist.  She does get hypoxic on ambulation and needs to be on at least 2 L of oxygen.  I have started the patient on Decadron.  She has been tolerating a regular diet.  Her wound appears clean.  Per 's note Pravena can be removed on POD 7, which is today. I have ordered home O2 and home health.      Consultants/Specialty  [facility] surgery  Critical care     Code Status  Full Code    Disposition  Transfer to [facility]/surg. [place] home with home health    Review of Systems  Review of Systems   Constitutional: Negative for fever.   Respiratory: Positive for cough and shortness of breath.    Cardiovascular: Negative for chest pain.   Gastrointestinal:        Anxious to eat   Neurological:        Generally weak but improving   All other systems reviewed and are negative.       Physical Exam  Temp:  [36.6 °C (97.9 °F)] 36.6 °C (97.9 °F)  Pulse:  [] 87  BP: ()/(29-59) 95/36  SpO2:  [90 %-95 %] 93 %    Physical Exam  Vitals signs and nursing note reviewed.   Constitutional:       Appearance: She is obese. She is not toxic-appearing.   HENT:      Head: Normocephalic and atraumatic.      Mouth/Throat:      Mouth: Mucous membranes are dry.      Pharynx: Oropharynx is clear.   Neck:      Comments: Right IJ central line  Cardiovascular:      Rate and Rhythm: Normal rate and regular rhythm.   Pulmonary:      Comments: Nasal cannula oxygen  Normal work of breathing  Somewhat diminished lung sounds throughout  Abdominal:      General: There is distension.      Palpations: Abdomen is soft.      Comments: Wound vac anteriorly    Musculoskeletal:      Right lower leg: Edema present.      Left lower leg: Edema present.   Skin:     General: Skin is warm and dry.   Neurological:      General: No focal deficit present.      Mental Status: She is alert and oriented to person, place, and time.   Psychiatric:         Mood and Affect: Mood normal.         Behavior:  Behavior normal.         Thought Content: Thought content normal.         Fluids    Intake/Output Summary (Last 24 hours) at 1/6/2021 1324  Last data filed at 1/5/2021 1400  Gross per 24 hour   Intake 240 ml   Output --   Net 240 ml       Laboratory  Recent Labs     01/04/21  0436   WBC 4.4*   RBC 3.50*   HEMOGLOBIN 10.2*   HEMATOCRIT 34.4*   MCV 98.3*   MCH 29.1   MCHC 29.7*   RDW 55.3*   PLATELETCT 186   MPV 10.4     Recent Labs     01/04/21  0436   SODIUM 145   POTASSIUM 4.2   CHLORIDE 114*   CO2 22   GLUCOSE 144*   BUN 16   CREATININE 1.33   CALCIUM 8.6                   Imaging  EC-ECHOCARDIOGRAM LTD W/O CONT   Final Result      DX-CHEST-PORTABLE (1 VIEW)   Final Result         Diffuse groundglass opacities throughout both lungs, in keeping with atypical infection      OUTSIDE IMAGES-CT CHEST   Final Result           Assessment/Plan  * Septic shock (HCC)- (present on admission)  Assessment & Plan  This is Septic shock Present on admission  SIRS criteria identified on my evaluation include: Fever, with temperature greater than 101 deg F  Source is perforated ulcer  Presentation includes: Severe sepsis present and persistent hypotension after 30 ml/kg completed.   Despite appropriate fluid resuscitation with crystalloid given per sepsis guidelines, the patient remains hypotensive with systolic blood pressure less than 90 or MAP less than 65  Hemodynamic support was given with additional fluids and she required IV vasopressors   IV antibiotics as appropriate for source of sepsis: Zosyn        Perforated ulcer (HCC)- (present on admission)  Assessment & Plan  Status post Valentin Patch by Dr. Romero on 12/30 and was cleared for a clear liquid diet on 1/3  She has been on an IV protonix drip transition to prilosec on 1/4  Oral oxycodone ordered   IV fentanyl for breakthrough pain  Lovenox prophylactic dose to start 1/4    COVID-19- (present on admission)  Assessment & Plan  Isolation precautions   Supplemental  oxygen  Started on Decadron 60 g daily for 10 days    Acute respiratory failure with hypoxia ([facility])  Assessment & Plan  In the setting of Covid 19 infection and recent surgical repair of perforated ulcer  Currently on 3L of oxygen  Encouraged IS use and mobilization      Depression- (present on admission)  Assessment & Plan  She has a history of depression in remission.  Restart home Abilify and Effexor QTC is 400    Encephalopathy acute- (present on admission)  Assessment & Plan  Acute metabolic encephalopathy secondary to sepsis and COVID    Hypothyroid- (present on admission)  Assessment & Plan  Restart home synthroid 75 mcg daily    DM (diabetes mellitus) ([facility])- (present on admission)  Assessment & Plan  HbA1c is 5.9  She is on Glimepiride 4 mg, Dulaglutide (GLP-1 agonist), Canagliflozin (SGLT2 inhibitor) 150 mg BID, and metformin 1,000 mg BID outpatient  Sliding scale insulin    LAN (acute kidney injury) ([facility])- (present on admission)  Assessment & Plan  Secondary to severe sepsis  IV fluids and follow urine output    Class 3 severe obesity in adult ([facility])- (present on admission)  Assessment & Plan  BMI 54       VTE prophylaxis: SCDs and lovenox

## 2021-01-06 NOTE — THERAPY
Physical Therapy   Initial Evaluation     Patient Name: Maria Hamman  Age:  50 y.o., Sex:  female  Medical Record #: [id number]  Today's Date: 1/6/2021     Precautions: Fall Risk    Assessment  Pt presents to PT with impaired balance, endurance, gait and general locomotion associated with deconditioning and medical co-morbidities. She was able to demonstrate short distance ambulation with no AD with HHA/CGA. She is primarily limited 2' to general fatigue and concerns with decreased Sp02 with minimal exertional activity. Anticipate as co-morbidities resolve and pt increases OOB time with staff (as medically appropriate), she will continue to progress well. See below for details/recs.     Plan    Recommend Physical Therapy 3 times per week until therapy goals are met for the following treatments:  Bed Mobility, Community Re-integration, Equipment, Gait Training, Manual Therapy, Neuro Re-Education / Balance, Self Care/Home Evaluation, Therapeutic Activities and Therapeutic Exercises    DC Equipment Recommendations: Unable to determine at this time  Discharge Recommendations: Recommend post-acute placement for additional physical therapy services prior to discharge home(currently,though anticipate quick recovery with inc OOB time)        01/06/21 0940   Prior Living Situation   Prior Services Home-Independent   Housing / Facility [address]   Steps Into Home 0   Steps In Home 0   Bathroom Set up Bathtub / Shower Combination   Equipment Owned None   Lives with - Patient's Self Care Capacity Alone and Able to Care For Self   Comments pt lives in [place]; here visiting family and will be staying with them at time of dc; reports family able to assist ; above info is for [place] home   Prior Level of Functional Mobility   Bed Mobility Independent   Transfer Status Independent   Ambulation Independent   Distance Ambulation (Feet)   (community)   Assistive Devices Used None   Stairs Independent   Comments I with IADLs and ADLs prior    History of Falls   History of Falls No   Cognition    Cognition / Consciousness WDL   Level of Consciousness Alert   Comments very pleasant and cooperative   Passive ROM Lower Body   Passive ROM Lower Body WDL   Active ROM Lower Body    Active ROM Lower Body  WDL   Strength Lower Body   Lower Body Strength  WDL   Sensation Lower Body   Lower Extremity Sensation   WDL   Balance Assessment   Sitting Balance (Static) Good   Sitting Balance (Dynamic) Fair +   Standing Balance (Static) Fair   Standing Balance (Dynamic) Fair -   Weight Shift Sitting Fair   Weight Shift Standing Fair   Comments no inés LOB during ambulation with HHA; see gait; high guard though this appears in part to recently limited mobility    Gait Analysis   Gait Level Of Assist Minimal Assist  (HHA/CGA)   Assistive Device Other (Comments)  (HHA)   Distance (Feet) 8   # of Times Distance was Traveled 2   Deviation Increased Base Of Support;Other (Comment)  (reciprocal gait; high guard; dec stephanie)   # of Stairs Climbed 0   Weight Bearing Status no restrictions   Comments see balance; more limited by concerns with dec Sp02 and general fatigue; mobilizing fair   Bed Mobility    Supine to Sit Minimal Assist   Sit to Supine Minimal Assist   Comments HOB elevated   Functional Mobility   Sit to Stand Minimal Assist  (CGA)   Bed, Chair, Wheelchair Transfer Minimal Assist   Toilet Transfers   (CGA/HHA)   Transfer Method Stand Step   Mobility with HHA   How much difficulty does the patient currently have...   Turning over in bed (including adjusting bedclothes, sheets and blankets)? 2   Sitting down on and standing up from a chair with arms (e.g., wheelchair, bedside commode, etc.) 3   Moving from lying on back to sitting on the side of the bed? 2   How much help from another person does the patient currently need...   Moving to and from a bed to a chair (including a wheelchair)? 3   Need to walk in a hospital room? 3   Climbing 3-5 steps with a railing? 3    6 clicks Mobility Score 16   Activity Tolerance   Sitting Edge of Bed at least 5 mins   Standing ~2-3 mins   Comments no overt/acute fatigue; general fatigue and noted mild SOB post ambulation    Edema / Skin Assessment   Edema / Skin  Not Assessed   Patient / Family Goals    Patient / Family Goal #1 to go home   Short Term Goals    Short Term Goal # 1 Pt will be able to perform supine<>sit with HOB flat/no rails with Spv in 6tx to promote fnx progression to I    Short Term Goal # 2 Pt will be able to perform sit<>stand/transfers with no AD with Spv in 6tx to promote fnx progression to I    Short Term Goal # 3 Pt will be able to ambulate 75ft with no AD with Spv in 6tx to promote fnx progression towards I

## 2021-01-06 NOTE — FACE TO FACE
Face to Face Supporting Documentation - Home Health    The encounter with this patient was in whole or in part the primary reason for home health admission.    Date of encounter:   Patient:                    MRN:                       YOB: 2023  Maria Hamman  [id number]  1970     Home health to see patient for:  Skilled Nursing care for assessment, interventions & education, Wound Care, Physical Therapy evaluation and treatment and Occupational therapy evaluation and treatment    Skilled need for:  Surgical Aftercare Perforated ulcer, Covid 19 infection, acute hypoxic respiratory failure    Skilled nursing interventions to include:  Wound Care    Homebound status evidenced by:  Needs the assistance of another person in order to leave the home. Leaving home requires a considerable and taxing effort. There is a normal inability to leave the home.    Community Physician to provide follow up care: No primary care provider on file.     Optional Interventions? No      I certify the face to face encounter for this home health care referral meets the CMS requirements and the encounter/clinical assessment with the patient was, in whole, or in part, for the medical condition(s) listed above, which is the primary reason for home health care. Based on my clinical findings: the service(s) are medically necessary, support the need for home health care, and the homebound criteria are met.  I certify that this patient has had a face to face encounter by myself.  Ever Monterroso M.D. - NPI: [id number]

## 2021-01-06 NOTE — THERAPY
"Occupational Therapy   Initial Evaluation     Patient Name: Maria Hamman  Age:  50 y.o., Sex:  female  Medical Record #: [id number]  Today's Date: 1/6/2021     Precautions:  Fall Risk  Comments:  abdominal wound vac     Assessment  Patient is 50 y.o. female admitted for worsening abdominal pain, PMXH: DM, thyroid d/o and chronic pain. This admission pt is dx w/COVID, sepsis w/o acute organ dysfunction septic shock, secondary HTN, and perforated viscus now s/p ex lap. Pt is demonstrating decreased mobility, post op pain and decreased activity tolerance impacting ADL's. At this time pt would require post acute placement, but pt is motivated and has good potential for improved functional recovery in this setting w/daily OOB activity. Will follow and continue to assess     Plan  Recommend Occupational Therapy 3 times per week until therapy goals are met for the following treatments:  Adaptive Equipment, Self Care/Activities of Daily Living, Therapeutic Activities and Therapeutic Exercises.    DC Equipment Recommendations: (P) Unable to determine at this time  Discharge Recommendations: (P) Recommend post-acute placement for additional occupational therapy services prior to discharge home(however has potential to improve in this setting )     Subjective  \"They didn't let me use the BSC this morning\"      Objective   01/06/21 0943   Prior Living Situation   Prior Services Home-Independent   Housing / Facility 1 Story House   Steps Into Home 0   Steps In Home 0   Bathroom Set up Bathtub / Shower Combination   Equipment Owned None   Lives with - Patient's Self Care Capacity Alone and Able to Care For Self   Comments pt normally resides in [place], here visiting family and is able to stay w/them as needed    Prior Level of ADL Function   Self Feeding Independent   Grooming / Hygiene Independent   Bathing Independent   Dressing Independent   Toileting Independent   Prior Level of IADL Function   Medication Management Independent "   Laundry Independent   Kitchen Mobility Independent   Finances Independent   Home Management Independent   Shopping Independent   Prior Level Of Mobility Independent Without Device in Community   Driving / Transportation Driving Independent   Precautions   Precautions Fall Risk   Comments abdominal wound vac    Pain 0 - 10 Group   Location Abdomen   Location Orientation Mid   Therapist Pain Assessment During Activity;Nurse Notified;4   Cognition    Cognition / Consciousness WDL   Level of Consciousness Alert   Comments very pleasant and cooperative    Passive ROM Upper Body   Passive ROM Upper Body WDL   Active ROM Upper Body   Active ROM Upper Body  WDL   Strength Upper Body   Upper Body Strength  WDL   Sensation Upper Body   Upper Extremity Sensation  WDL   Upper Body Muscle Tone   Upper Body Muscle Tone  WDL   Neurological Concerns   Neurological Concerns No   Coordination Upper Body   Coordination WDL   Balance Assessment   Sitting Balance (Static) Good   Sitting Balance (Dynamic) Fair +   Standing Balance (Static) Fair   Standing Balance (Dynamic) Fair -   Weight Shift Sitting Fair   Weight Shift Standing Fair   Comments w/HHA    Bed Mobility    Supine to Sit Minimal Assist   Sit to Supine Minimal Assist   ADL Assessment   Grooming Supervision;Seated   Upper Body Dressing Supervision   Lower Body Dressing Moderate Assist   Toileting Moderate Assist   Functional Mobility   Sit to Stand Minimal Assist   Bed, Chair, Wheelchair Transfer Minimal Assist   Mobility walking in room w/HHA    Visual Perception   Visual Perception  Not Tested   Edema / Skin Assessment   Edema / Skin  Not Assessed   Activity Tolerance   Comments no overt c/o pain or fatigue    Patient / Family Goals   Patient / Family Goal #1 to get up to the bathroom    Short Term Goals   Short Term Goal # 1 pt will complete grooming standing at sink w/spv    Short Term Goal # 2 pt will complete toilet txf w/spv    Short Term Goal # 3 pt will complete LB  dressing w/min A and AE as needed    Education Group   Role of Occupational Therapist Patient Response Patient;Acceptance;Explanation   Problem List   Problem List Decreased Active Daily Living Skills;Decreased Upper Extremity Strength Right;Decreased Upper Extremity Strength Left;Decreased Functional Mobility;Decreased Activity Tolerance;Impaired Postural Control / Balance   Anticipated Discharge Equipment and Recommendations   DC Equipment Recommendations Unable to determine at this time   Discharge Recommendations Recommend post-acute placement for additional occupational therapy services prior to discharge home  (however has potential to improve in this setting )   Interdisciplinary Plan of Care Collaboration   IDT Collaboration with  Nursing   Patient Position at End of Therapy In Bed;Call Light within Reach;Tray Table within Reach;Phone within Reach   Collaboration Comments RN aware of OT eval and pts efforts    Session Information   Date / Session Number  1/6 #1 (1/3, 1/12).   Priority 2

## 2021-01-06 NOTE — PROGRESS NOTES
Pt mobilized x2 today, does not want to get up to chair. Pt ambulates with 1 assist. O2 down to 3L via NC. No BM today, + bowel sounds, wound vac dressing CDI. Pt has not taken any pain medication so far this shift. Will advance diet as tolerated per Dr Russell

## 2021-01-06 NOTE — FACE TO FACE
"Face to Face Note  -  Durable Medical Equipment    Ever Monterroso M.D. - NPI: [id number]  I certify that this patient is under my care and that they had a durable medical equipment(DME)face to face encounter by myself that meets the physician DME face-to-face encounter requirements with this patient on:    Date of encounter:   Patient:                    MRN:                       YOB: 2023  Maria Hamman  [id number]  1970     The encounter with the patient was in whole, or in part, for the following medical condition, which is the primary reason for durable medical equipment:  Other - Covid 19 infection    I certify that, based on my findings, the following durable medical equipment is medically necessary:  Oxygen.    HOME O2 Saturation Measurements:(Values must be present for Home Oxygen orders)  Room air sat at rest: 81  Room air sat with amb: 78  With liters of O2: 2, O2 sat at rest with O2: 95  With Liters of O2: 2, O2 sat with amb with O2 : 93  Is the patient mobile?: Yes    My Clinical findings support the need for the above equipment due to:  Hypoxia    Supporting Symptoms: The patient requires supplemental oxygen, as the following interventions have been tried with limited or no improvement: \"Ambulation with oximetry and \"Incentive spirometry    If patient feels more short of breath, they can go up to 6 liters per minute and contact healthcare provider.  "

## 2021-01-06 NOTE — RESPIRATORY CARE
Called to floor to set up patient with home monitoring. On arrival, Patient stated there was a problem with her D/C and that she wouldn't be leaving until tomorrow. Awaiting verification from  or RN. Will set up later today if patient is scheduled to D/C today, otherwise will set up tomorrow.

## 2021-01-06 NOTE — DISCHARGE PLANNING
Anticipated Discharge Disposition: home w/ HH and O2 vs placement    Action:   1431 spoke w/ pt [phone]. Pt lives in [place] but states she'll stay w/ brother Cm post DC for a few weeks. N . Notified admitting. Brother's address [address], [place] [address]. Discussed O2 and HH w/ pt. Choice faxed to [facility] ([facility] and [facility]). PMR referral placed per protocol as placement also recommended by OT 1/6  1600: notified RT Estevan that MD would like to have home O2 monitoring and possible DC tomorrow. Estevan states he'll follow up w/ pt tomorrow. Received call from Fouzia at [facility] [phone]. Fouzia states she's unable to reach pt on cell to collect payment for O2 $37.60. Attempted to call pt on cell several times, no answer. Notified [facility]. [facility] not contracted w/ insurance. Sent referral to [facility].  1627: spoke w/ Nirav at [facility] and updated Nirav that unable to reach pt on cell and will follow up in the morning    Barriers to Discharge:   covid+    Plan: TBD

## 2021-01-07 ENCOUNTER — PATIENT OUTREACH (OUTPATIENT)
Dept: HEALTH INFORMATION MANAGEMENT | Facility: OTHER | Age: 51
End: 2021-01-07

## 2021-01-07 VITALS
HEART RATE: 73 BPM | DIASTOLIC BLOOD PRESSURE: 45 MMHG | BODY MASS INDEX: 45.99 KG/M2 | RESPIRATION RATE: 20 BRPM | HEIGHT: 67 IN | TEMPERATURE: 97.1 F | WEIGHT: 293 LBS | OXYGEN SATURATION: 92 % | SYSTOLIC BLOOD PRESSURE: 103 MMHG

## 2021-01-07 PROBLEM — R65.21 SEPTIC SHOCK (HCC): Status: RESOLVED | Noted: 2020-12-31 | Resolved: 2021-01-07

## 2021-01-07 PROBLEM — G93.40 ENCEPHALOPATHY ACUTE: Status: RESOLVED | Noted: 2021-01-04 | Resolved: 2021-01-07

## 2021-01-07 PROBLEM — A41.9 SEPTIC SHOCK (HCC): Status: RESOLVED | Noted: 2020-12-31 | Resolved: 2021-01-07

## 2021-01-07 PROBLEM — N17.9 AKI (ACUTE KIDNEY INJURY) (HCC): Status: RESOLVED | Noted: 2020-12-31 | Resolved: 2021-01-07

## 2021-01-07 PROBLEM — K27.5 PERFORATED ULCER (HCC): Status: RESOLVED | Noted: 2020-12-31 | Resolved: 2021-01-07

## 2021-01-07 LAB
ANION GAP SERPL CALC-SCNC: 11 MMOL/L (ref 7–16)
ANISOCYTOSIS BLD QL SMEAR: ABNORMAL
BASOPHILS # BLD AUTO: 0 % (ref 0–1.8)
BASOPHILS # BLD: 0 K/UL (ref 0–0.12)
BUN SERPL-MCNC: 20 MG/DL (ref 8–22)
CALCIUM SERPL-MCNC: 8.9 MG/DL (ref 8.5–10.5)
CHLORIDE SERPL-SCNC: 101 MMOL/L (ref 96–112)
CO2 SERPL-SCNC: 21 MMOL/L (ref 20–33)
CREAT SERPL-MCNC: 1.18 MG/DL (ref 0.5–1.4)
EOSINOPHIL # BLD AUTO: 0 K/UL (ref 0–0.51)
EOSINOPHIL NFR BLD: 0 % (ref 0–6.9)
ERYTHROCYTE [DISTWIDTH] IN BLOOD BY AUTOMATED COUNT: 49.9 FL (ref 35.9–50)
GLUCOSE BLD-MCNC: 209 MG/DL (ref 65–99)
GLUCOSE BLD-MCNC: 255 MG/DL (ref 65–99)
GLUCOSE SERPL-MCNC: 206 MG/DL (ref 65–99)
HCT VFR BLD AUTO: 36.2 % (ref 37–47)
HGB BLD-MCNC: 10.8 G/DL (ref 12–16)
LYMPHOCYTES # BLD AUTO: 0.87 K/UL (ref 1–4.8)
LYMPHOCYTES NFR BLD: 32.2 % (ref 22–41)
MANUAL DIFF BLD: NORMAL
MCH RBC QN AUTO: 28.3 PG (ref 27–33)
MCHC RBC AUTO-ENTMCNC: 29.8 G/DL (ref 33.6–35)
MCV RBC AUTO: 94.8 FL (ref 81.4–97.8)
MONOCYTES # BLD AUTO: 0.14 K/UL (ref 0–0.85)
MONOCYTES NFR BLD AUTO: 5.2 % (ref 0–13.4)
MORPHOLOGY BLD-IMP: NORMAL
NEUTROPHILS # BLD AUTO: 1.69 K/UL (ref 2–7.15)
NEUTROPHILS NFR BLD: 60 % (ref 44–72)
NEUTS BAND NFR BLD MANUAL: 2.6 % (ref 0–10)
NRBC # BLD AUTO: 0 K/UL
NRBC BLD-RTO: 0 /100 WBC
PLATELET # BLD AUTO: 225 K/UL (ref 164–446)
PLATELET BLD QL SMEAR: NORMAL
PMV BLD AUTO: 11.6 FL (ref 9–12.9)
POTASSIUM SERPL-SCNC: 4 MMOL/L (ref 3.6–5.5)
RBC # BLD AUTO: 3.82 M/UL (ref 4.2–5.4)
RBC BLD AUTO: PRESENT
SODIUM SERPL-SCNC: 133 MMOL/L (ref 135–145)
WBC # BLD AUTO: 2.7 K/UL (ref 4.8–10.8)

## 2021-01-07 PROCEDURE — 700111 HCHG RX REV CODE 636 W/ 250 OVERRIDE (IP): Performed by: HOSPITALIST

## 2021-01-07 PROCEDURE — 85007 BL SMEAR W/DIFF WBC COUNT: CPT

## 2021-01-07 PROCEDURE — 99453 REM MNTR PHYSIOL PARAM SETUP: CPT

## 2021-01-07 PROCEDURE — 99239 HOSP IP/OBS DSCHRG MGMT >30: CPT | Performed by: INTERNAL MEDICINE

## 2021-01-07 PROCEDURE — 97530 THERAPEUTIC ACTIVITIES: CPT

## 2021-01-07 PROCEDURE — A9270 NON-COVERED ITEM OR SERVICE: HCPCS | Performed by: HOSPITALIST

## 2021-01-07 PROCEDURE — 99454 REM MNTR PHYSIOL PARAM 16-30: CPT

## 2021-01-07 PROCEDURE — 82962 GLUCOSE BLOOD TEST: CPT

## 2021-01-07 PROCEDURE — 700102 HCHG RX REV CODE 250 W/ 637 OVERRIDE(OP): Performed by: INTERNAL MEDICINE

## 2021-01-07 PROCEDURE — A9270 NON-COVERED ITEM OR SERVICE: HCPCS | Performed by: INTERNAL MEDICINE

## 2021-01-07 PROCEDURE — 700102 HCHG RX REV CODE 250 W/ 637 OVERRIDE(OP): Performed by: HOSPITALIST

## 2021-01-07 PROCEDURE — 80048 BASIC METABOLIC PNL TOTAL CA: CPT

## 2021-01-07 PROCEDURE — 85027 COMPLETE CBC AUTOMATED: CPT

## 2021-01-07 RX ORDER — OXYCODONE HYDROCHLORIDE 5 MG/1
5 TABLET ORAL EVERY 6 HOURS PRN
Qty: 20 TAB | Refills: 0 | Status: SHIPPED | OUTPATIENT
Start: 2021-01-07 | End: 2021-01-12

## 2021-01-07 RX ORDER — ASCORBIC ACID 500 MG
1000 TABLET ORAL 2 TIMES DAILY
Status: DISCONTINUED | OUTPATIENT
Start: 2021-01-07 | End: 2021-01-07 | Stop reason: HOSPADM

## 2021-01-07 RX ORDER — ZINC SULFATE 50(220)MG
220 CAPSULE ORAL DAILY
Qty: 30 CAP | Refills: 0 | Status: SHIPPED | OUTPATIENT
Start: 2021-01-08

## 2021-01-07 RX ORDER — OMEPRAZOLE 20 MG/1
20 CAPSULE, DELAYED RELEASE ORAL 2 TIMES DAILY
Qty: 60 CAP | Refills: 0 | Status: SHIPPED | OUTPATIENT
Start: 2021-01-07

## 2021-01-07 RX ORDER — VITAMIN B COMPLEX
1000 TABLET ORAL DAILY
Status: DISCONTINUED | OUTPATIENT
Start: 2021-01-07 | End: 2021-01-07 | Stop reason: HOSPADM

## 2021-01-07 RX ORDER — ZINC SULFATE 50(220)MG
220 CAPSULE ORAL DAILY
Status: DISCONTINUED | OUTPATIENT
Start: 2021-01-07 | End: 2021-01-07 | Stop reason: HOSPADM

## 2021-01-07 RX ORDER — OXYCODONE HYDROCHLORIDE 5 MG/1
5 TABLET ORAL EVERY 6 HOURS PRN
Qty: 20 TAB | Refills: 0 | Status: SHIPPED
Start: 2021-01-07 | End: 2021-01-07

## 2021-01-07 RX ORDER — DEXAMETHASONE 6 MG/1
6 TABLET ORAL DAILY
Qty: 7 TAB | Refills: 0 | Status: SHIPPED | OUTPATIENT
Start: 2021-01-08 | End: 2021-01-15

## 2021-01-07 RX ORDER — DEXAMETHASONE 6 MG/1
6 TABLET ORAL DAILY
Qty: 7 TAB | Refills: 0 | Status: SHIPPED
Start: 2021-01-08 | End: 2021-01-07

## 2021-01-07 RX ADMIN — ENOXAPARIN SODIUM 60 MG: 60 INJECTION SUBCUTANEOUS at 05:23

## 2021-01-07 RX ADMIN — LEVOTHYROXINE SODIUM 75 MCG: 0.07 TABLET ORAL at 05:23

## 2021-01-07 RX ADMIN — ZINC SULFATE 220 MG (50 MG) CAPSULE 220 MG: CAPSULE at 09:25

## 2021-01-07 RX ADMIN — VENLAFAXINE 75 MG: 75 TABLET ORAL at 05:24

## 2021-01-07 RX ADMIN — Medication 1000 UNITS: at 09:26

## 2021-01-07 RX ADMIN — DEXAMETHASONE 6 MG: 6 TABLET ORAL at 05:24

## 2021-01-07 RX ADMIN — ARIPIPRAZOLE 10 MG: 10 TABLET ORAL at 05:24

## 2021-01-07 RX ADMIN — OMEPRAZOLE 20 MG: 20 CAPSULE, DELAYED RELEASE ORAL at 05:23

## 2021-01-07 RX ADMIN — INSULIN LISPRO 7 UNITS: 100 INJECTION, SOLUTION INTRAVENOUS; SUBCUTANEOUS at 11:31

## 2021-01-07 RX ADMIN — OXYCODONE HYDROCHLORIDE AND ACETAMINOPHEN 1000 MG: 500 TABLET ORAL at 09:26

## 2021-01-07 RX ADMIN — PREGABALIN 150 MG: 150 CAPSULE ORAL at 08:22

## 2021-01-07 RX ADMIN — INSULIN LISPRO 4 UNITS: 100 INJECTION, SOLUTION INTRAVENOUS; SUBCUTANEOUS at 05:25

## 2021-01-07 RX ADMIN — DOCUSATE SODIUM 50 MG AND SENNOSIDES 8.6 MG 2 TABLET: 8.6; 5 TABLET, FILM COATED ORAL at 05:24

## 2021-01-07 RX ADMIN — INSULIN LISPRO 4 UNITS: 100 INJECTION, SOLUTION INTRAVENOUS; SUBCUTANEOUS at 00:32

## 2021-01-07 ASSESSMENT — ENCOUNTER SYMPTOMS
FEVER: 0
COUGH: 1
SHORTNESS OF BREATH: 1
ROS GI COMMENTS: ANXIOUS TO EAT

## 2021-01-07 ASSESSMENT — FIBROSIS 4 INDEX: FIB4 SCORE: 1.25

## 2021-01-07 ASSESSMENT — COGNITIVE AND FUNCTIONAL STATUS - GENERAL
DRESSING REGULAR LOWER BODY CLOTHING: A LITTLE
HELP NEEDED FOR BATHING: A LITTLE
DAILY ACTIVITIY SCORE: 21
SUGGESTED CMS G CODE MODIFIER DAILY ACTIVITY: CJ
TOILETING: A LITTLE

## 2021-01-07 NOTE — PROGRESS NOTES
[facility] Medicine Daily Progress Note    Date of Service  1/7/2021    Chief Complaint  50 y.o. female admitted 12/30/2020 with abdominal pain    Hospital Course  Ms. Hamman has a history of diabetes, hypothyroidism, and remote history of gastric bypass 1990's that developed a fever, cough, and abdominal pain. She went to the free-standing ER and was found to have COVID+ status and CT revealed pneumoperitoneum. She was hypotensive and placed on IV pressors and transferred to [facility]. She underwent exploratory laparotomy with modified Valentin Patch of a perforated marginal ulcer by Dr. Romero and was admitted to the ICU under COVID isolation. She was treated with IV Zosyn and IV fluids.       Interval Problem Update  1/3: patient seen and evaluated in the COVID ICU. She is off of the IV Levophed drip since 1/2. She is on 3 liters nasal cannula oxygen. Wound vac is on. Her RN notes that she received 4 doses of IV fentanyl last night. Dr. oRmero has cleared her for clears today. She is quite confused and only able to provide a modest history.  Ms. Hamman has a wound vac.  1/4: Ms. Hamman was evaluated and examined in the ICU. I discussed with Dr. Romero about prophylactic lovenox and he has authorized starting today as well as changing IV protonix to prilosec. She is on 8 liters nasal cannula oxygen. Ms. Hamman is awake, alert, and oriented x 4. She is tolerating a clear liquid diet. Her home meds were reviewed and her antidepressants and synthroid were restarted.   1/5 Patient seen and examined at bedside. She is laying comfortably in bed in no distress. She is currently on 3L of O2 via nasal canula. She has been able to get her self up and ambulate to the chair with assistance. She reports some pain of her surgical site with deep breathing or coughing. I have encouraged her to use the incentive spirometer. She has been tolerating full liquid diet. PT OT     1/6 patient states that she worked with PT  OT this morning and was able to ambulate to the chair with one assist.  She does get hypoxic on ambulation and needs to be on at least 2 L of oxygen.  I have started the patient on Decadron.  She has been tolerating a regular diet.  Her wound appears clean.  Per 's note Pravena can be removed on POD 7, which is today. I have ordered home O2 and home health.      1/7 No acute events. Pt's oxygen requirements have gone up to 4L. She was evaluated by PT/OT and has now been recommended HH. Home O2 has been setup. Patient is cleared for discharge, awaiting home health setup    Consultants/Specialty  [facility]  Critical care     Code Status  Full Code    Disposition  Transfer to med/surg. NE home with home health    Review of Systems  Review of Systems   Constitutional: Negative for fever.   Respiratory: Positive for cough and shortness of breath.    Cardiovascular: Negative for chest pain.   Gastrointestinal:        Anxious to eat   Neurological:        Generally weak but improving   All other systems reviewed and are negative.       Physical Exam  Temp:  [36.1 °C (97 °F)-37.5 °C (99.5 °F)] 36.1 °C (97 °F)  Pulse:  [49-85] 76  BP: ()/(44-91) 117/49  SpO2:  [86 %-100 %] 93 %    Physical Exam  Vitals signs and nursing note reviewed.   Constitutional:       Appearance: She is obese. She is not toxic-appearing.   HENT:      Head: Normocephalic and atraumatic.      Mouth/Throat:      Mouth: Mucous membranes are dry.      Pharynx: Oropharynx is clear.   Neck:      Comments: Right IJ central line  Cardiovascular:      Rate and Rhythm: Normal rate and regular rhythm.   Pulmonary:      Comments: Nasal cannula oxygen  Normal work of breathing  Somewhat diminished lung sounds throughout  Abdominal:      General: There is distension.      Palpations: Abdomen is soft.      Comments: Wound vac anteriorly    Musculoskeletal:      Right lower leg: Edema present.      Left lower leg: Edema present.   Skin:      General: Skin is warm and dry.   Neurological:      General: No focal deficit present.      Mental Status: She is alert and oriented to person, place, and time.   Psychiatric:         Mood and Affect: Mood normal.         Behavior: Behavior normal.         Thought Content: Thought content normal.         Fluids    Intake/Output Summary (Last 24 hours) at 1/7/2021 1202  Last data filed at 1/6/2021 1659  Gross per 24 hour   Intake --   Output 0 ml   Net 0 ml       Laboratory  Recent Labs     01/07/21  0326   WBC 2.7*   RBC 3.82*   HEMOGLOBIN 10.8*   HEMATOCRIT 36.2*   MCV 94.8   MCH 28.3   MCHC 29.8*   RDW 49.9   PLATELETCT 225   MPV 11.6     Recent Labs     01/07/21  0326   SODIUM 133*   POTASSIUM 4.0   CHLORIDE 101   CO2 21   GLUCOSE 206*   BUN 20   CREATININE 1.18   CALCIUM 8.9                   Imaging  EC-ECHOCARDIOGRAM LTD W/O CONT   Final Result      DX-CHEST-PORTABLE (1 VIEW)   Final Result         Diffuse groundglass opacities throughout both lungs, in keeping with atypical infection      OUTSIDE IMAGES-CT CHEST   Final Result           Assessment/Plan  COVID-19- (present on admission)  Assessment & Plan  Isolation precautions   Supplemental oxygen  Started on Decadron 6 mg daily for 10 days    Acute respiratory failure with hypoxia (HCC)- (present on admission)  Assessment & Plan  In the setting of Covid 19 infection and recent surgical repair of perforated ulcer  Currently on 4L of oxygen  Encouraged IS use and mobilization      Depression- (present on admission)  Assessment & Plan  She has a history of depression in remission.  Restart home Abilify and Effexor QTC is 400    Hypothyroid- (present on admission)  Assessment & Plan  Restart home synthroid 75 mcg daily    DM (diabetes mellitus) (HCC)- (present on admission)  Assessment & Plan  HbA1c is 5.9  She is on Glimepiride 4 mg, Dulaglutide (GLP-1 agonist), Canagliflozin (SGLT2 inhibitor) 150 mg BID, and metformin 1,000 mg BID outpatient  Sliding scale  insulin    Class 3 severe obesity in adult (HCC)- (present on admission)  Assessment & Plan  BMI 54       VTE prophylaxis: SCDs and lovenox

## 2021-01-07 NOTE — RESPIRATORY CARE
REMOTE MONITORING PROGRAM by RESPIRATORY THERAPY  1/7/2021 at 3:56 PM by Griselda Ramesh, RRT     Patient interviewed by RT. Patient agrees to Remote Monitoring program. Device instruction performed with welcome packet, consent signed and placed at bedside chart. Patient instructed on how to use MyChart and Zoom. No questions at this time.

## 2021-01-07 NOTE — DISCHARGE PLANNING
[facility] Transitional Care Coordination     Referral from:  Dr. Monterroso   Facesheet indicates: [facility]   Potential Rehab Diagnosis: Debility     Chart review indicates patient limited on going medical management and limited  therapy needs to possibly meet inpatient rehab facility criteria with the goal of returning to community.    D/C support:Family      Physiatry consultation denied  per protocol.      Limited therapy need for IRF level of care. Anticipate home with outpatient support when medically cleared. Discharge planning notes reviewed.         Thank you for the referral.

## 2021-01-07 NOTE — PROGRESS NOTES
Per request of Dr. Monterroso wound vac removed and notified at time of removal. Midline surgical site staples intact with no noted dehiscence, redness, swelling, or oozing. Site left CARMEN with staples intact.

## 2021-01-07 NOTE — WOUND TEAM
Wound consult placed 1/6 by Hospitalist regarding surgical wound and prevena wound vac. This RN spoke with bedside RNSukumar confirmed prevena was in place from surgery. Per Bedside RN surgery gave permission for prevena to be DC'd. Discussed with bedside RN that prevena wound vacs are placed over closed incisions in OR and can be DC'd/stop working POD #7 and are removed and discarded. Bedside RN will change out prevena for island dressing per order he received. No further advanced wound care needs identified. Wound consult completed.

## 2021-01-07 NOTE — THERAPY
"Occupational Therapy  Daily Treatment     Patient Name: Maria Hamman  Age:  50 y.o., Sex:  female  Medical Record #: [id number]  Today's Date: 1/7/2021     Precautions  Precautions: (P) Fall Risk  Comments: (P) abdominal surgery    Assessment    Pt seen for follow up OT tx session, with emphasis on improving activity tolerance as well as overall strength. Pt open about being SOB at baseline with activity even prior to recent admission due to her weight but that she has good strategies to support self as well as help from friends/family to assist her as needed. Pt at a supervision level for ambulation and transfers, refused bathroom mobility due to no need but based on presentation and level of assist available at home pt seems to be at a level safe enough to discharge home with home health and family assist as needed. Pts O2 sats decreased to 83-84% with ambulation on 5L but able to recover in a reasonable amount of time.    Plan    Continue current treatment plan.    DC Equipment Recommendations: (P) Tub Transfer Bench  Discharge Recommendations: (P) Recommend home health for continued occupational therapy services    Subjective    \"My brother has set-up the house for me and someone will always be home\"     Objective       01/07/21 0947   Pain 0 - 10 Group   Therapist Pain Assessment Post Activity Pain Same as Prior to Activity;Nurse Notified;0   Cognition    Cognition / Consciousness WDL   Level of Consciousness Alert   Comments pleasant, cooperative, good insight into deficits   Active ROM Upper Body   Active ROM Upper Body  WDL   Dominant Hand Right   Strength Upper Body   Upper Body Strength  WDL   Other Treatments   Other Treatments Provided Ambulation within room with seated rest breaks as needed. Education given on pacing, pt aware of baseline limitations due to weight. Discussed level of assist as well as support in place at discharge.   Balance   Sitting Balance (Static) Good   Sitting Balance (Dynamic) Fair " +   Standing Balance (Static) Fair   Standing Balance (Dynamic) Fair -   Weight Shift Sitting Fair   Weight Shift Standing Fair   Skilled Intervention Verbal Cuing   Comments cues for pacing and seated rest breaks   Bed Mobility    Supine to Sit Minimal Assist   Sit to Supine Supervised   Scooting Supervised   Rolling Supervised   Skilled Intervention Verbal Cuing   Comments modified log roll technique, use of bed rails   Activities of Daily Living   Grooming Supervision   Upper Body Dressing Supervision   Lower Body Dressing Moderate Assist   Toileting   (NT-refused need)   How much help from another person does the patient currently need...   Putting on and taking off regular lower body clothing? 3   Bathing (including washing, rinsing, and drying)? 3   Toileting, which includes using a toilet, bedpan, or urinal? 3   Putting on and taking off regular upper body clothing? 4   Taking care of personal grooming such as brushing teeth? 4   Eating meals? 4   6 Clicks Daily Activity Score 21   Functional Mobility   Sit to Stand Supervised   Bed, Chair, Wheelchair Transfer Supervised   Toilet Transfers Refused   Activity Tolerance   Sitting Edge of Bed 12   Standing 10   Comments O2 monitored after ambulation 83-84% on 5L post-ambulation   Patient / Family Goals   Patient / Family Goal #1 to get up to the bathroom    Goal #1 Outcome Progressing as expected   Short Term Goals   Short Term Goal # 1 pt will complete grooming standing at sink w/spv    Goal Outcome # 1 Progressing as expected   Short Term Goal # 2 pt will complete toilet txf w/spv    Goal Outcome # 2 Progressing as expected   Short Term Goal # 3 pt will complete LB dressing w/min A and AE as needed    Goal Outcome # 3 Progressing slower than expected   Education Group   Education Provided Role of Occupational Therapist   Role of Occupational Therapist Patient Response Patient;Acceptance;Explanation   Interdisciplinary Plan of Care Collaboration   IDT  Collaboration with  Nursing   Patient Position at End of Therapy In Bed;Call Light within Reach;Tray Table within Reach;Phone within Reach   Collaboration Comments Rn updated

## 2021-01-07 NOTE — DISCHARGE INSTRUCTIONS
Discharge Instructions    Discharged to home by car with relative. Discharged via wheelchair, hospital escort: Yes.  Special equipment needed: Oxygen    Be sure to schedule a follow-up appointment with your primary care doctor or any specialists as instructed.     Discharge Plan:        I understand that a diet low in cholesterol, fat, and sodium is recommended for good health. Unless I have been given specific instructions below for another diet, I accept this instruction as my diet prescription.   Other diet: n/a    Special Instructions: None    · Is patient discharged on Warfarin / Coumadin?   No     Depression / Suicide Risk    As you are discharged from this [facility] facility, it is important to learn how to keep safe from harming yourself.    Recognize the warning signs:  · Abrupt changes in personality, positive or negative- including increase in energy   · Giving away possessions  · Change in eating patterns- significant weight changes-  positive or negative  · Change in sleeping patterns- unable to sleep or sleeping all the time   · Unwillingness or inability to communicate  · Depression  · Unusual sadness, discouragement and loneliness  · Talk of wanting to die  · Neglect of personal appearance   · Rebelliousness- reckless behavior  · Withdrawal from people/activities they love  · Confusion- inability to concentrate     If you or a loved one observes any of these behaviors or has concerns about self-harm, here's what you can do:  · Talk about it- your feelings and reasons for harming yourself  · Remove any means that you might use to hurt yourself (examples: pills, rope, extension cords, firearm)  · Get professional help from the community (Mental Health, Substance Abuse, psychological counseling)  · Do not be alone:Call your Safe Contact- someone whom you trust who will be there for you.  · Call your local CRISIS HOTLINE 971-9373 or 018-775-9939  · Call your local Children's Mobile Crisis Response  Team [facility] (505) 443-7406 or www.[employer].Fourteen IP  · Call the toll free National Suicide Prevention Hotlines   · National Suicide Prevention Lifeline 207-317-UXEX (7608)  · National Hope Line Network 800-SUICIDE (644-4515)

## 2021-01-07 NOTE — DISCHARGE PLANNING
Anticipated Discharge Disposition: home w/ [facility] and O2 [facility]    Action:   0908: received call from Alysa at [facility] [phone]. Informed Alysa that pt is from [place] and doesn't have local PCP and will be staying w/ brother Cm in [place] post discharge. Alysa states they can assist with arranging local PCP but need permission from pt. Spoke w/ pt on hospital room phone (cell phone low on battery). Explained to pt that  needs local PCP to sign orders. Pt agrees to having assistance to set up local PCP for HH. Explained to pt due to pandemic and pt not covid recovered yet, difficult to place pt in facility. Pt states she's willing to return home w/ brother and have HH. Pt states her portable O2 is delivered at bedside but was charged by [facility] $120. Left  w/ [facility] to clarify copay. Left  w/ Alysa at [facility] to update.  0947: spoke w/ Rosa Elena at [facility]. Per Rosa Elena, pt was only charged copay $37.60, not $120.  10:00 spoke w/ Alysa at [facility]. Alysa states they're still in process of verifying insurance coverage whether it'll be out of network since pt has insurance from [place] and that pt needs to establish and see local PCP first before they can accept referral.  12:30 spoke jd/ Alysa at [facility]. Still pending insurance authorization (out of state)  12:55 received call from Alysa and confirmed they've received insurance authorization. Alysa would like to know if Dr. Monterroso would be willing to sign home health orders until pt is established w/ local PCP. Updated Dr. Monterroso  1324: had conference call w/ Dr. Monterroso and [facility] clinical manager Ivelisse [phone]. Dr. Monterroso unable to sign HH orders due to hospitalist schedule. Ivelisse states she'll try to talk to Shakira for assistance w/ local PCP. Spoke w/  Melissa. Melissa recommends [facility] (entered in AVS).   1330: per Dr. Monterroso, pt's supposed to follow up w/ surgeon Dr. Romero in a  week (not entered in AVS). Texted Dr. Romero to confirm.  1400: spoke w/ Chema at [facility]. They'll try to see if they have PCP that can accept pt's insurance and see pt sooner and get back to this writer  1440: spoke w/ Martina at [facility]. Martina states that because pt's still covid, pt not allowed to have in person appointment w/ PCP and it needs to be virtual appt. Even w/ virtual appt, earliest possible appt would be 1-2 weeks later. Spoke w/ Shakira at [facility]. Shakira states earliest PCP appt won't be available for a couple of weeks and needs to know how long pt is willing to commit to staying in [place].  1505: spoke w/ Chema at [facility]. Chema states he's still waiting to hear from their PCP team if they can accept pt. Asked cca to send referral to [facility]. Spoke w/ pt on phone. Pt states she'll be staying in [place] at least for a month and planning to move to [place] in May.  1530: spoke jd/ Shakira at [facility]. PCP appt scheduled w/ Jo Ann Cox 1415 1/8 w/ [facility] [id number] (info and instructions entered in AVS. Removed [facility] from AVS). Notified RT Griselda for home O2 monitoring. Griselda states she'll see pt shortly. Entered follow up appt w/ Dr. Romero in one week in AVS. Updated pt. Pt is anxious to go home and states brother is expecting her. Notified Chema at [facility] to cancel referral. Chema states their PCP team unable to accept pt's insurance.  1609: remote O2 monitoring appt scheduled x10 days. Updated pt. Pt states she also O2 concentrator from [facility] at bedside. Pt has ride home from brother. Updated BSRN    Barriers to Discharge:   [facility] insurance    Plan: home today w/ ride from brother.

## 2021-01-08 ENCOUNTER — TELEMEDICINE (OUTPATIENT)
Dept: URGENT CARE | Facility: CLINIC | Age: 51
End: 2021-01-08
Payer: COMMERCIAL

## 2021-01-08 VITALS — RESPIRATION RATE: 26 BRPM | OXYGEN SATURATION: 92 % | HEART RATE: 98 BPM

## 2021-01-08 DIAGNOSIS — E03.9 HYPOTHYROIDISM, UNSPECIFIED TYPE: ICD-10-CM

## 2021-01-08 DIAGNOSIS — E66.01 CLASS 3 OBESITY (HCC): ICD-10-CM

## 2021-01-08 DIAGNOSIS — Z79.4 TYPE 2 DIABETES MELLITUS WITHOUT COMPLICATION, WITH LONG-TERM CURRENT USE OF INSULIN (HCC): ICD-10-CM

## 2021-01-08 DIAGNOSIS — E11.9 TYPE 2 DIABETES MELLITUS WITHOUT COMPLICATION, WITH LONG-TERM CURRENT USE OF INSULIN (HCC): ICD-10-CM

## 2021-01-08 DIAGNOSIS — J12.82 PNEUMONIA DUE TO COVID-19 VIRUS: ICD-10-CM

## 2021-01-08 DIAGNOSIS — U07.1 PNEUMONIA DUE TO COVID-19 VIRUS: ICD-10-CM

## 2021-01-08 PROCEDURE — 99458 RPM TX MGMT EA ADDL 20 MIN: CPT | Mod: 95,CR | Performed by: PHYSICIAN ASSISTANT

## 2021-01-08 PROCEDURE — 99457 RPM TX MGMT 1ST 20 MIN: CPT | Mod: 95,CR | Performed by: PHYSICIAN ASSISTANT

## 2021-01-08 RX ORDER — BENZONATATE 100 MG/1
100 CAPSULE ORAL 3 TIMES DAILY PRN
Qty: 60 CAP | Refills: 0 | Status: SHIPPED | OUTPATIENT
Start: 2021-01-08

## 2021-01-08 ASSESSMENT — ENCOUNTER SYMPTOMS
FEVER: 0
COUGH: 1
SHORTNESS OF BREATH: 1
DIARRHEA: 0
VOMITING: 0

## 2021-01-08 NOTE — DISCHARGE SUMMARY
Discharge Summary    CHIEF COMPLAINT ON ADMISSION  Chief Complaint   Patient presents with   • Abdominal Pain     esophageal tear per outlying facility       Reason for Admission  Esophageal tear, initial encounter     Admission Date  12/30/2020    CODE STATUS  Prior    HPI & HOSPITAL COURSE  50-year-old female with a past medical history of diabetes, hypothyroidism, and remote history of gastric bypass in the 1990's that developed a fever, cough, and abdominal pain. She went to the free-standing ER and was found to have COVID+ status and CT revealed pneumoperitoneum. She was hypotensive and placed on IV pressors and transferred to [facility] [place], [place]. She underwent exploratory laparotomy and was found to have a perforated marginal ulcer and underwent repair with with modified Valentin Patch by Dr. Romero. She was treated with IV Zosyn and IV fluids.  Her diet was slowly advanced and she is now been tolerating a regular diet.  Patient developed mild respiratory distress and was requiring 3 to 4 L of oxygen by nasal cannula due to which she was started on Decadron.  Patient worked with PT OT and was recommended ongoing therapies with home health.  Her surgical wound appeared clean, dry and intact on the day of discharge and she has been instructed to follow-up with surgery in a week.    Therefore, she is discharged in good and stable condition to home with organized home healthcare and close outpatient follow-up.    The patient met 2-midnight criteria for an inpatient stay at the time of discharge.    Discharge Date  1/7/2021    FOLLOW UP ITEMS POST DISCHARGE  Follow-up with surgery    DISCHARGE DIAGNOSES  Principal Problem (Resolved):    Septic shock (HCC) POA: Yes  Active Problems:    COVID-19 POA: Yes    Class 3 severe obesity in adult ([facility]) POA: Yes    DM (diabetes mellitus) ([facility]) POA: Yes    Hypothyroid POA: Yes    Depression POA: Yes    Acute respiratory failure with hypoxia ([facility]) POA: Yes  Resolved  Problems:    Perforated ulcer (HCC) POA: Yes    LAN (acute kidney injury) (HCC) POA: Yes    Stridor POA: Unknown    Encephalopathy acute POA: Yes      FOLLOW UP  Future Appointments   Date Time Provider Department Center   1/8/2021  9:00 AM [facility]   1/9/2021  9:15 AM [facility]   1/10/2021  9:15 AM [facility]   1/11/2021  9:15 AM [facility]   1/12/2021  9:15 AM [facility]   1/13/2021  9:15 AM [facility]   1/14/2021  9:15 AM [facility]   1/15/2021  9:15 AM [facility]   1/16/2021  9:15 AM [facility]   1/17/2021  9:15 AM [facility]       MARIO Rocha  Family Medicine  [facility]  [address] [place] [address]  [phone]  [phone]  Go in 1 day  appt with Jo Ann Cox at 14:15 1/8. please check in 13:50. bring a mask, your medication list, and insurance card    Ari Romero M.D.  [address] [address]  [phone]    Schedule an appointment as soon as possible for a visit in 1 week        MEDICATIONS ON DISCHARGE     Medication List      START taking these medications      Instructions   ascorbic acid 1000 MG tablet  Commonly known as: VITAMIN C   Take 1 Tab by mouth 2 Times a Day.  Dose: 1,000 mg     dexamethasone 6 MG Tabs  Commonly known as: DECADRON   Take 1 Tab by mouth every day for 7 days.  Dose: 6 mg     omeprazole 20 MG delayed-release capsule  Commonly known as: PRILOSEC   Take 1 Cap by mouth 2 Times a Day.  Dose: 20 mg     oxyCODONE immediate-release 5 MG Tabs  Commonly known as: ROXICODONE   Take 1 Tab by mouth every 6 hours as needed for Severe Pain for up to 5 days.  Dose: 5 mg     vitamin D 1000 UNIT Tabs  Commonly known as: VITAMIND D3   Take 1 Tab by mouth every day.  Dose: 1,000 Units     zinc sulfate 220 (50 Zn) MG Caps  Commonly known  as: ZINCATE   Take 1 Cap by mouth every day.  Dose: 220 mg        CONTINUE taking these medications      Instructions   ARIPiprazole 10 MG Tabs  Commonly known as: Abilify   Take 10 mg by mouth every day.  Dose: 10 mg     celecoxib 200 MG Caps  Commonly known as: CELEBREX   Take 200 mg by mouth every day.  Dose: 200 mg     glimepiride 4 MG Tabs  Commonly known as: AMARYL   Take 4 mg by mouth every morning.  Dose: 4 mg     Invokamet 150-1000 MG Tabs  Generic drug: Canagliflozin-metFORMIN HCl   Take 1 Tab by mouth 2 Times a Day.  Dose: 1 Tab     levothyroxine 75 MCG Tabs  Commonly known as: SYNTHROID   Take 75 mcg by mouth Every morning on an empty stomach. Indications: Underactive Thyroid  Dose: 75 mcg     lisinopril 10 MG Tabs  Commonly known as: PRINIVIL   Take 10 mg by mouth every day. Indications: High Blood Pressure Disorder  Dose: 10 mg     pregabalin 150 MG Caps  Commonly known as: LYRICA   Take 150 mg by mouth 2 times a day.  Dose: 150 mg     simvastatin 10 MG Tabs  Commonly known as: ZOCOR   Take 10 mg by mouth every evening.  Dose: 10 mg     Trulicity 0.75 MG/0.5ML Sopn  Generic drug: Dulaglutide   Inject 1 mL under the skin every 7 days.  Dose: 1 mL     venlafaxine 75 MG Tabs  Commonly known as: EFFEXOR   Take 75 mg by mouth 2 Times a Day.  Dose: 75 mg            Allergies  No Known Allergies    DIET  No orders of the defined types were placed in this encounter.      ACTIVITY  As tolerated.  Weight bearing as tolerated    CONSULTATIONS  Surgery Dr. Rao    PROCEDURES  Ex lap    LABORATORY  Lab Results   Component Value Date    SODIUM 133 (L) 01/07/2021    POTASSIUM 4.0 01/07/2021    CHLORIDE 101 01/07/2021    CO2 21 01/07/2021    GLUCOSE 206 (H) 01/07/2021    BUN 20 01/07/2021    CREATININE 1.18 01/07/2021        Lab Results   Component Value Date    WBC 2.7 (L) 01/07/2021    HEMOGLOBIN 10.8 (L) 01/07/2021    HEMATOCRIT 36.2 (L) 01/07/2021    PLATELETCT 225 01/07/2021        Total time of the discharge  process exceeds 34 minutes.

## 2021-01-08 NOTE — PROGRESS NOTES
Patient discahrged at 1740 to home with home health via wheelchair escorted by CNA to [facility]. Patient's brother driving patient home. Home health and follow up appt. scheduled per . Patient discharged on 4L [place] home O2. All personal belongings taken with patient. Central line removed per Dr. Monterroso prior to discharge with no complications.  Site clean, dry, and intact. Midline incision clean, dry, and intact. No drainage noted.  All staples intact and site CARMEN.  All discharge paperwork, including narcotic consent form, signed and filed in chart.  No complaints of pain or SOB. No other complications with discharge.

## 2021-01-08 NOTE — PROGRESS NOTES
Subjective:      Maria Hamman is a 50 y.o. female who presents with Coronavirus Screening ([facility])    This evaluation was conducted via Zoom using secure and encrypted videoconferencing technology. The patient was in a private location in the state [facility].    The patient's identity was confirmed and verbal consent was obtained for this virtual visit.    Home Monitoring Patient Triage  COVID-19 Monitoring Level: Home with basic monitoring       [facility] Flowsheet   1. Record COVID-19 Severity Index (qCSI):  5  2.Confirm appropriate patient and chart with two identifiers: Yes - continue to question #3   3. Days Since Onset of Symptoms: 9  4. Does patient have another adult at home to help take care of you: Yes - continue to question #5  5. Confirm O2 supply is working and there are no cannula problems: Yes - continue to question #6  6. Is your breathing today better or worse? Same  7. Are you able to tolerate fluids? Yes - Continue to question #8  8. Any vomiting or diarrhea in the last 24 hours? No - continue to question #9  9. Are you able to control your fevers? N/A  10. Are you able to control your cough? No - Review appropriate use of OTC cough suppressants  11. Current O2 Flow Rate: 4  12. Review ER precautions: present to ED or call 911 if experiencing severe shortness of breath: Yes  13. Confirm next VV: Appointment scheduled  14. Final disposition: Stable at home  15. Time Spent: 15          Patient hospitalized 9 days ago.  She was doing well in the hospital 9 days ago due to abdominal pain.  She was found to have perforated ulcer and had to undergo emergency surgery.  She was found to be Covid positive while in the hospital.  She was discharged last night with a long list of medications which are listed below.  She has yet to pick them up.    She reports no overnight events.  Currently on 4 L oxygen day and night.  Denies chest pain, significant shortness of breath.  She is ambulating.  Eating and  drinking normal.    Pneumonia  She complains of cough and shortness of breath (Improving). This is a new problem. The current episode started 1 to 4 weeks ago. The problem occurs constantly. The problem has been gradually improving. Pertinent negatives include no chest pain or fever.         PMH:  has no past medical history on file.  MEDS:   Current Outpatient Medications:   •  benzonatate (TESSALON) 100 MG Cap, Take 1 Cap by mouth 3 times a day as needed for Cough., Disp: 60 Cap, Rfl: 0  •  ascorbic acid (VITAMIN C) 1000 MG tablet, Take 1 Tab by mouth 2 Times a Day., Disp: 30 Tab, Rfl: 0  •  omeprazole (PRILOSEC) 20 MG delayed-release capsule, Take 1 Cap by mouth 2 Times a Day., Disp: 60 Cap, Rfl: 0  •  vitamin D (VITAMIND D3) 1000 UNIT Tab, Take 1 Tab by mouth every day., Disp: 60 Tab, Rfl: 0  •  zinc sulfate (ZINCATE) 220 (50 Zn) MG Cap, Take 1 Cap by mouth every day., Disp: 30 Cap, Rfl: 0  •  dexamethasone (DECADRON) 6 MG Tab, Take 1 Tab by mouth every day for 7 days., Disp: 7 Tab, Rfl: 0  •  oxyCODONE immediate-release (ROXICODONE) 5 MG Tab, Take 1 Tab by mouth every 6 hours as needed for Severe Pain for up to 5 days., Disp: 20 Tab, Rfl: 0  •  lisinopril (PRINIVIL) 10 MG Tab, Take 10 mg by mouth every day. Indications: High Blood Pressure Disorder, Disp: , Rfl:   •  levothyroxine (SYNTHROID) 75 MCG Tab, Take 75 mcg by mouth Every morning on an empty stomach. Indications: Underactive Thyroid, Disp: , Rfl:   •  simvastatin (ZOCOR) 10 MG Tab, Take 10 mg by mouth every evening., Disp: , Rfl:   •  Canagliflozin-metFORMIN HCl (INVOKAMET) 150-1000 MG Tab, Take 1 Tab by mouth 2 Times a Day., Disp: , Rfl:   •  celecoxib (CELEBREX) 200 MG Cap, Take 200 mg by mouth every day., Disp: , Rfl:   •  glimepiride (AMARYL) 4 MG Tab, Take 4 mg by mouth every morning., Disp: , Rfl:   •  ARIPiprazole (ABILIFY) 10 MG Tab, Take 10 mg by mouth every day., Disp: , Rfl:   •  venlafaxine (EFFEXOR) 75 MG Tab, Take 75 mg by mouth 2 Times a  Day., Disp: , Rfl:   •  pregabalin (LYRICA) 150 MG Cap, Take 150 mg by mouth 2 times a day., Disp: , Rfl:   •  Dulaglutide (TRULICITY) 0.75 MG/0.5ML Solution Pen-injector, Inject 1 mL under the skin every 7 days., Disp: , Rfl:   ALLERGIES: No Known Allergies  SURGHX:   Past Surgical History:   Procedure Laterality Date   • PB EXPLORATORY OF ABDOMEN  12/30/2020    Procedure: LAPAROTOMY, EXPLORATORY, with patch;  Surgeon: Ari Romero M.D.;  Location: SURGERY [facility];  Service: Vascular     SOCHX:    FH: family history is not on file.    Review of Systems   Constitutional: Negative for fever.   Respiratory: Positive for cough and shortness of breath (Improving).    Cardiovascular: Negative for chest pain.   Gastrointestinal: Negative for diarrhea and vomiting.       Medications, Allergies, and current problem list reviewed today in Epic     Objective:     Pulse 98   Resp (!) 26   SpO2 92%      Physical Exam  Vitals signs and nursing note reviewed.   Constitutional:       General: She is not in acute distress.     Appearance: Normal appearance. She is well-developed. She is not ill-appearing or toxic-appearing.   HENT:      Head: Normocephalic and atraumatic.      Right Ear: External ear normal.      Left Ear: External ear normal.      Nose: Nose normal.   Eyes:      Conjunctiva/sclera: Conjunctivae normal.   Pulmonary:      Effort: Pulmonary effort is normal.   Neurological:      Mental Status: She is alert and oriented to person, place, and time.   Psychiatric:         Mood and Affect: Mood normal.         Behavior: Behavior normal.         Thought Content: Thought content normal.         Judgment: Judgment normal.                 Assessment/Plan:         1. Pneumonia due to COVID-19 virus  benzonatate (TESSALON) 100 MG Cap   2. Class 3 obesity     3. Type 2 diabetes mellitus without complication, with long-term current use of insulin ([facility])     4. Hypothyroidism, unspecified type       Severity score 5.   Vital signs have been stable.  Her increased respiratory rate was secondary to ambulation otherwise this has been stable.  Has not started her medications yet but she is heading to the pharmacy after this appointment.  Eating and drinking normal.  Denies chest pain, vomiting, diarrhea, fevers, increasing shortness of breath.  She has follow-up appointment tomorrow.  ER precautions reiterated.  No further questions or concerns.    Diabetes and thyroid well controlled on current medication.  She states her sugars have been elevated but well maintained.    Please note that this dictation was created using voice recognition software. I have made every reasonable attempt to correct obvious errors, but I expect that there are errors of grammar and possibly content that I did not discover before finalizing the note.

## 2021-01-09 ENCOUNTER — TELEMEDICINE (OUTPATIENT)
Dept: URGENT CARE | Facility: CLINIC | Age: 51
End: 2021-01-09
Payer: COMMERCIAL

## 2021-01-09 VITALS — TEMPERATURE: 98.7 F | OXYGEN SATURATION: 94 % | HEART RATE: 77 BPM | RESPIRATION RATE: 23 BRPM

## 2021-01-09 DIAGNOSIS — E11.9 TYPE 2 DIABETES MELLITUS WITHOUT COMPLICATION, WITH LONG-TERM CURRENT USE OF INSULIN (HCC): ICD-10-CM

## 2021-01-09 DIAGNOSIS — J12.82 PNEUMONIA DUE TO COVID-19 VIRUS: ICD-10-CM

## 2021-01-09 DIAGNOSIS — E66.01 CLASS 3 OBESITY (HCC): ICD-10-CM

## 2021-01-09 DIAGNOSIS — E03.9 HYPOTHYROIDISM, UNSPECIFIED TYPE: ICD-10-CM

## 2021-01-09 DIAGNOSIS — U07.1 PNEUMONIA DUE TO COVID-19 VIRUS: ICD-10-CM

## 2021-01-09 DIAGNOSIS — Z79.4 TYPE 2 DIABETES MELLITUS WITHOUT COMPLICATION, WITH LONG-TERM CURRENT USE OF INSULIN (HCC): ICD-10-CM

## 2021-01-09 PROCEDURE — 99999 PR NO CHARGE: CPT | Mod: 95 | Performed by: PHYSICIAN ASSISTANT

## 2021-01-09 ASSESSMENT — ENCOUNTER SYMPTOMS
CHILLS: 0
CONSTIPATION: 0
VOMITING: 0
SHORTNESS OF BREATH: 1
NAUSEA: 0
DIARRHEA: 0
ABDOMINAL PAIN: 0
FEVER: 0
COUGH: 1

## 2021-01-09 NOTE — PROGRESS NOTES
Subjective:   Maria Hamman is a 50 y.o. female who presents for Covid-19 Home Monitoring Video Visit    This evaluation was conducted via Zoom using secure and encrypted videoconferencing technology. The patient was in a private location in the [facility].    The patient's identity was confirmed and verbal consent was obtained for this virtual visit.      HPI    Pt feeling better today, she took musinex last night with improvement. She is schedule to have staples removed on 13th. She noticed some soreness in area. No redness or discharge. No fever or chills. No nausea, vomiting or diarrhea.     Pt follow up with new pcp - Dr. Cox [facility].      Home Monitoring Patient Triage  COVID-19 Monitoring Level: Home with basic monitoring       [facility] Flowsheet   1. Record COVID-19 Severity Index (qCSI):  4  2.Confirm appropriate patient and chart with two identifiers: Yes - continue to question #3   3. Days Since Onset of Symptoms: 10  4. Does patient have another adult at home to help take care of you: Yes - continue to question #5  5. Confirm O2 supply is working and there are no cannula problems: Yes - continue to question #6  6. Is your breathing today better or worse? Better - continue to question #7  7. Are you able to tolerate fluids? Yes - Continue to question #8  8. Any vomiting or diarrhea in the last 24 hours? No - continue to question #9  9. Are you able to control your fevers? Yes - continue to question #10  10. Are you able to control your cough? No - Review appropriate use of OTC cough suppressants(she hasnt been able to  rx from pharmacy yet. Started musinex yesterday with improvement )  11. Current O2 Flow Rate: 4  12. Review ER precautions: present to ED or call 911 if experiencing severe shortness of breath: Yes  13. Confirm next VV: Appointment scheduled  14. Final disposition: Stable at home  15. Time Spent: 20        Review of Systems   Constitutional: Negative for  chills, fever and malaise/fatigue.   Respiratory: Positive for cough and shortness of breath.    Gastrointestinal: Negative for abdominal pain, constipation, diarrhea, nausea and vomiting.   All other systems reviewed and are negative.      PMH:  has no past medical history on file.  MEDS:   Current Outpatient Medications:   •  benzonatate (TESSALON) 100 MG Cap, Take 1 Cap by mouth 3 times a day as needed for Cough., Disp: 60 Cap, Rfl: 0  •  ascorbic acid (VITAMIN C) 1000 MG tablet, Take 1 Tab by mouth 2 Times a Day., Disp: 30 Tab, Rfl: 0  •  omeprazole (PRILOSEC) 20 MG delayed-release capsule, Take 1 Cap by mouth 2 Times a Day., Disp: 60 Cap, Rfl: 0  •  vitamin D (VITAMIND D3) 1000 UNIT Tab, Take 1 Tab by mouth every day., Disp: 60 Tab, Rfl: 0  •  zinc sulfate (ZINCATE) 220 (50 Zn) MG Cap, Take 1 Cap by mouth every day., Disp: 30 Cap, Rfl: 0  •  dexamethasone (DECADRON) 6 MG Tab, Take 1 Tab by mouth every day for 7 days., Disp: 7 Tab, Rfl: 0  •  oxyCODONE immediate-release (ROXICODONE) 5 MG Tab, Take 1 Tab by mouth every 6 hours as needed for Severe Pain for up to 5 days., Disp: 20 Tab, Rfl: 0  •  lisinopril (PRINIVIL) 10 MG Tab, Take 10 mg by mouth every day. Indications: High Blood Pressure Disorder, Disp: , Rfl:   •  levothyroxine (SYNTHROID) 75 MCG Tab, Take 75 mcg by mouth Every morning on an empty stomach. Indications: Underactive Thyroid, Disp: , Rfl:   •  simvastatin (ZOCOR) 10 MG Tab, Take 10 mg by mouth every evening., Disp: , Rfl:   •  Canagliflozin-metFORMIN HCl (INVOKAMET) 150-1000 MG Tab, Take 1 Tab by mouth 2 Times a Day., Disp: , Rfl:   •  celecoxib (CELEBREX) 200 MG Cap, Take 200 mg by mouth every day., Disp: , Rfl:   •  glimepiride (AMARYL) 4 MG Tab, Take 4 mg by mouth every morning., Disp: , Rfl:   •  ARIPiprazole (ABILIFY) 10 MG Tab, Take 10 mg by mouth every day., Disp: , Rfl:   •  venlafaxine (EFFEXOR) 75 MG Tab, Take 75 mg by mouth 2 Times a Day., Disp: , Rfl:   •  pregabalin (LYRICA) 150 MG  Cap, Take 150 mg by mouth 2 times a day., Disp: , Rfl:   •  Dulaglutide (TRULICITY) 0.75 MG/0.5ML Solution Pen-injector, Inject 1 mL under the skin every 7 days., Disp: , Rfl:   ALLERGIES: No Known Allergies  SURGHX:   Past Surgical History:   Procedure Laterality Date   • PB EXPLORATORY OF ABDOMEN  12/30/2020    Procedure: LAPAROTOMY, EXPLORATORY, with patch;  Surgeon: Ari Romero M.D.;  Location: SURGERY [facility];  Service: Vascular     SOCHX:    History reviewed. No pertinent family history.     Objective:   Pulse 77   Temp 37.1 °C (98.7 °F)   Resp (!) 23   SpO2 94%   Physical Exam:  Constitutional: Alert, no distress, well-groomed.  Skin: No rashes in visible areas.  Eye: Round. Conjunctiva clear, lids normal. No icterus.   ENMT: Lips pink without lesions, good dentition, moist mucous membranes. Phonation normal.  Neck: No masses, no thyromegaly. Moves freely without pain.  CV: Pulse as reported by patient  Respiratory: Unlabored respiratory effort, no cough or audible wheeze  Psych: Alert and oriented x3, normal affect and mood.         Assessment/Plan:     1. Pneumonia due to COVID-19 virus     2. Class 3 obesity     3. Type 2 diabetes mellitus without complication, with long-term current use of insulin (HCC)     4. Hypothyroidism, unspecified type       Attempted to wean pt down to 3L SpO2 decreased to 89% at rest. She will continue 4 L until tomorrows follow up. She will continue musinex.       Follow-up scheduled for tomorrow. Red flags and emergency room precautions discussed. Patient confirmed understanding of information.    Please note that this dictation was created using voice recognition software. I have made every reasonable attempt to correct obvious errors, but I expect that there are errors of grammar and possibly content that I did not discover before finalizing the note.

## 2021-01-09 NOTE — PROGRESS NOTES
This evaluation was conducted via Zoom using secure and encrypted videoconferencing technology. The patient was in a private location in the [facility].    The patient's identity was confirmed and verbal consent was obtained for this virtual visit.    Home Monitoring Patient Triage  COVID-19 Monitoring Level:         [facility] Flowsheet   1. Record COVID-19 Severity Index (qCSI):     2.Confirm appropriate patient and chart with two identifiers:     3. Days Since Onset of Symptoms:    4. Does patient have another adult at home to help take care of you:    5. Confirm O2 supply is working and there are no cannula problems:    6. Is your breathing today better or worse?    7. Are you able to tolerate fluids?    8. Any vomiting or diarrhea in the last 24 hours?    9. Are you able to control your fevers?    10. Are you able to control your cough?    11. Current O2 Flow Rate:    12. Review ER precautions: present to ED or call 911 if experiencing severe shortness of breath:    13. Confirm next VV:    14. Final disposition:    15. Time Spent:

## 2021-01-10 ENCOUNTER — TELEMEDICINE (OUTPATIENT)
Dept: URGENT CARE | Facility: CLINIC | Age: 51
End: 2021-01-10
Payer: COMMERCIAL

## 2021-01-10 DIAGNOSIS — J12.82 PNEUMONIA DUE TO COVID-19 VIRUS: ICD-10-CM

## 2021-01-10 DIAGNOSIS — J96.01 ACUTE RESPIRATORY FAILURE WITH HYPOXIA (HCC): ICD-10-CM

## 2021-01-10 DIAGNOSIS — U07.1 PNEUMONIA DUE TO COVID-19 VIRUS: ICD-10-CM

## 2021-01-10 DIAGNOSIS — Z99.81 SUPPLEMENTAL OXYGEN DEPENDENT: ICD-10-CM

## 2021-01-10 PROCEDURE — 99213 OFFICE O/P EST LOW 20 MIN: CPT | Mod: 95 | Performed by: NURSE PRACTITIONER

## 2021-01-10 NOTE — PROGRESS NOTES
This evaluation was conducted via Zoom using secure and encrypted videoconferencing technology. The patient was in a private location in the [facility].    The patient's identity was confirmed and verbal consent was obtained for this virtual visit.    Home Monitoring Patient Triage  COVID-19 Monitoring Level:  Home oxygen monitoring program       [facility] Flowsheet   1. Record COVID-19 Severity Index (qCSI):  7  2.Confirm appropriate patient and chart with two identifiers: Yes - continue to question #3   3. Days Since Onset of Symptoms: 12  4. Does patient have another adult at home to help take care of you: Yes - continue to question #5  5. Confirm O2 supply is working and there are no cannula problems: Yes - continue to question #6  6. Is your breathing today better or worse? Better - continue to question #7  7. Are you able to tolerate fluids? Yes - Continue to question #8  8. Any vomiting or diarrhea in the last 24 hours? No - continue to question #9  9. Are you able to control your fevers? N/A  10. Are you able to control your cough? Yes - continue to question #11  11. Current O2 Flow Rate: 4  12. Review ER precautions: present to ED or call 911 if experiencing severe shortness of breath: Yes  13. Confirm next VV: Appointment scheduled  14. Final disposition: Stable at home  15. Time Spent: 20       Subjective:   CC: s/p Covid pneumonia, home monitoring program    Maria Hamman is a 50 y.o. female who presents for follow up s/p Covid.  She is feeling better overall from her visit yesterday.  She did have 2 episodes of desaturation, however she notes that she was doing things around the house.  She also desatted during the night while she was sleeping, down to 84%.  Her surgical wound and staples continue to bother her, however she follows up with Dr. Romero on Wednesday.  Is feeling more stable regards to respiratory status, ready to trial weaning of oxygen.    ROS   Denies any recent fevers or  chills. No nausea or vomiting. No chest pains or shortness of breath.     No Known Allergies    Current medicines (including changes today)  Current Outpatient Medications   Medication Sig Dispense Refill   • benzonatate (TESSALON) 100 MG Cap Take 1 Cap by mouth 3 times a day as needed for Cough. 60 Cap 0   • ascorbic acid (VITAMIN C) 1000 MG tablet Take 1 Tab by mouth 2 Times a Day. 30 Tab 0   • omeprazole (PRILOSEC) 20 MG delayed-release capsule Take 1 Cap by mouth 2 Times a Day. 60 Cap 0   • vitamin D (VITAMIND D3) 1000 UNIT Tab Take 1 Tab by mouth every day. 60 Tab 0   • zinc sulfate (ZINCATE) 220 (50 Zn) MG Cap Take 1 Cap by mouth every day. 30 Cap 0   • dexamethasone (DECADRON) 6 MG Tab Take 1 Tab by mouth every day for 7 days. 7 Tab 0   • oxyCODONE immediate-release (ROXICODONE) 5 MG Tab Take 1 Tab by mouth every 6 hours as needed for Severe Pain for up to 5 days. 20 Tab 0   • lisinopril (PRINIVIL) 10 MG Tab Take 10 mg by mouth every day. Indications: High Blood Pressure Disorder     • levothyroxine (SYNTHROID) 75 MCG Tab Take 75 mcg by mouth Every morning on an empty stomach. Indications: Underactive Thyroid     • simvastatin (ZOCOR) 10 MG Tab Take 10 mg by mouth every evening.     • Canagliflozin-metFORMIN HCl (INVOKAMET) 150-1000 MG Tab Take 1 Tab by mouth 2 Times a Day.     • celecoxib (CELEBREX) 200 MG Cap Take 200 mg by mouth every day.     • glimepiride (AMARYL) 4 MG Tab Take 4 mg by mouth every morning.     • ARIPiprazole (ABILIFY) 10 MG Tab Take 10 mg by mouth every day.     • venlafaxine (EFFEXOR) 75 MG Tab Take 75 mg by mouth 2 Times a Day.     • pregabalin (LYRICA) 150 MG Cap Take 150 mg by mouth 2 times a day.     • Dulaglutide (TRULICITY) 0.75 MG/0.5ML Solution Pen-injector Inject 1 mL under the skin every 7 days.       No current facility-administered medications for this visit.        Patient Active Problem List    Diagnosis Date Noted   • COVID-19 12/31/2020     Priority: Medium   • Acute  respiratory failure with hypoxia ([facility]) 01/05/2021   • Depression 01/04/2021   • Hypothyroid 01/03/2021   • Class 3 severe obesity in adult ([facility]) 12/31/2020   • DM (diabetes mellitus) ([facility]) 12/31/2020       History reviewed. No pertinent family history.    She  has no past medical history on file.  She  has a past surgical history that includes pr exploratory of abdomen (12/30/2020).       Objective:   SpO2: 92%  RR: 17     Physical Exam:  Constitutional: Alert, no distress, well-groomed.  Skin: No rashes in visible areas.  Eye: Round. Conjunctiva clear, lids normal. No icterus.   ENMT: Lips pink without lesions, good dentition, moist mucous membranes. Phonation normal.  Nasal cannula in place.  Neck: No masses, no thyromegaly. Moves freely without pain.  Respiratory: Unlabored respiratory effort, no cough or audible wheeze  Psych: Alert and oriented x3, normal affect and mood.       Assessment and Plan:   The following treatment plan was discussed:     1. Pneumonia due to COVID-19 virus    2. Acute respiratory failure with hypoxia ([facility])    3. Supplemental oxygen dependent    -Continue home monitoring  - Patient is currently on 4L of oxygen, will trial titrate to oxygen to 3L, continue to monitor oxygen and respiratory rate  -Discussed importance of frequent breathing exercises throughout the day   - Encouraged increased fluids and rest   -Continue present medications as discussed  - Follow up tomorrow  -Patient was reassured and educated to monitor for any sudden changes in symptoms that may cause dizziness, weakness, dyspnea with O2, inability to take in fluids, chest pain, heart racing, etc.  Should this occur, the patient understands that they must go back to ED for further evaluation.  All questions answered and she agrees to plan of care.

## 2021-01-11 ENCOUNTER — TELEMEDICINE (OUTPATIENT)
Dept: URGENT CARE | Facility: CLINIC | Age: 51
End: 2021-01-11
Payer: COMMERCIAL

## 2021-01-11 VITALS — OXYGEN SATURATION: 91 % | RESPIRATION RATE: 17 BRPM

## 2021-01-11 DIAGNOSIS — E11.9 TYPE 2 DIABETES MELLITUS WITHOUT COMPLICATION, WITH LONG-TERM CURRENT USE OF INSULIN (HCC): ICD-10-CM

## 2021-01-11 DIAGNOSIS — Z79.4 TYPE 2 DIABETES MELLITUS WITHOUT COMPLICATION, WITH LONG-TERM CURRENT USE OF INSULIN (HCC): ICD-10-CM

## 2021-01-11 DIAGNOSIS — J12.82 PNEUMONIA DUE TO COVID-19 VIRUS: ICD-10-CM

## 2021-01-11 DIAGNOSIS — U07.1 PNEUMONIA DUE TO COVID-19 VIRUS: ICD-10-CM

## 2021-01-11 DIAGNOSIS — Z99.81 SUPPLEMENTAL OXYGEN DEPENDENT: ICD-10-CM

## 2021-01-11 PROCEDURE — 99213 OFFICE O/P EST LOW 20 MIN: CPT | Mod: 95 | Performed by: NURSE PRACTITIONER

## 2021-01-11 ASSESSMENT — ENCOUNTER SYMPTOMS
DIZZINESS: 0
VOMITING: 0
WHEEZING: 0
SHORTNESS OF BREATH: 0
ABDOMINAL PAIN: 0
NAUSEA: 0
FEVER: 0
DIARRHEA: 1
CHILLS: 0
HEADACHES: 0
COUGH: 1
CONSTIPATION: 0
BLOOD IN STOOL: 1

## 2021-01-11 NOTE — PROGRESS NOTES
Telemedicine Visit:      Subjective:   Maria Hamman is a 50 y.o. female presenting for evaluation and management of Covid-19 Home Monitoring     This evaluation was conducted via Zoom using secure and encrypted videoconferencing technology. The patient was in a private location in the state [facility].    The patient's identity was confirmed and verbal consent was obtained for this virtual visit.    Home Monitoring Patient Triage  COVID-19 Monitoring Level: Home with basic monitoring       [facility] Flowsheet   1. Record COVID-19 Severity Index (qCSI):  6  2.Confirm appropriate patient and chart with two identifiers: Yes - continue to question #3   3. Days Since Onset of Symptoms: 13  4. Does patient have another adult at home to help take care of you: Yes - continue to question #5  5. Confirm O2 supply is working and there are no cannula problems: Yes - continue to question #6  6. Is your breathing today better or worse? Better - continue to question #7  7. Are you able to tolerate fluids? No - Encourage trial of fluids, if concerns for dehydration contact [facility] for direct admit  8. Any vomiting or diarrhea in the last 24 hours? No - continue to question #9  9. Are you able to control your fevers? No - review appropriate acetaminophen or ibuprofen dosing  10. Are you able to control your cough? Yes - continue to question #11  11. Current O2 Flow Rate: 3  12. Review ER precautions: present to ED or call 911 if experiencing severe shortness of breath: Yes  13. Confirm next VV: Appointment scheduled  14. Final disposition: Stable at home  15. Time Spent: 10    Patient states she is feeling better and currently her oxygen is at 3L and she feels better than yesterday. She has been unable to obtain her dexamethasone but should have it today. She is not taking her blood sugars as she does not have a monitor but is taking all DM medication, as prescribed. She has a FU appointment with surgeon to remove  her staples on 1/13/2021. She already had a FU appointment with her PCP when she got home from the hospital. Does note some blood in her stool that is decreasing in amount.       Review of Systems   Constitutional: Negative for chills, fever and malaise/fatigue.   Respiratory: Positive for cough. Negative for shortness of breath and wheezing.    Gastrointestinal: Positive for blood in stool and diarrhea. Negative for abdominal pain, constipation, nausea and vomiting.   Neurological: Negative for dizziness and headaches.       Current medicines (including changes today)  Medications:    • ARIPiprazole Tabs  • ascorbic acid  • benzonatate Caps  • celecoxib Caps  • dexamethasone Tabs  • glimepiride Tabs  • Invokamet Tabs  • levothyroxine Tabs  • lisinopril Tabs  • omeprazole  • oxyCODONE immediate-release Tabs  • pregabalin Caps  • simvastatin Tabs  • Trulicity Sopn  • venlafaxine Tabs  • vitamin D Tabs  • zinc sulfate Caps    Allergies: Patient has no known allergies.    Problem List: Maria Hamman has COVID-19; Class 3 severe obesity in adult (HCC); DM (diabetes mellitus) ([facility]); Hypothyroid; Depression; and Acute respiratory failure with hypoxia ([facility]) on their problem list.    Surgical History:  Past Surgical History:   Procedure Laterality Date   • PB EXPLORATORY OF ABDOMEN  12/30/2020    Procedure: LAPAROTOMY, EXPLORATORY, with patch;  Surgeon: Ari Romero M.D.;  Location: SURGERY [facility];  Service: Vascular       Past Social Hx: Maria Hamman  reports that she has never smoked. She has never used smokeless tobacco.     Past Family Hx:  Maria Hamman family history is not on file.     Problem list, medications, and allergies reviewed by myself today in Epic.     Objective:   Resp 17   SpO2 91%  (from [facility] home monitor)    Physical Exam:  Constitutional:       General: Awake.      Appearance: Not toxic-appearing.   HENT:      Nose: No signs of injury.      Mouth/Throat: Voice clear.     Lips: Pink.    Eyes:      General: Lids are normal.      Conjunctiva/sclera: Conjunctivae normal.   Neck:      Trachea: Phonation normal.   Pulmonary:      Effort: Pulmonary effort is normal.   Skin:     Coloration: Skin is not cyanotic or pale.   Neurological:      Mental Status: Alert and oriented to person, place, and time.   Psychiatric:         Mood and Affect: Mood and affect normal.         Speech: Speech normal.         Behavior: Behavior is cooperative.       Assessment and Plan:   The following treatment plan was discussed:     1. Pneumonia due to COVID-19 virus    2. Supplemental oxygen dependent    3. Type 2 diabetes mellitus without complication, with long-term current use of insulin (HCC)      CSI:6  Day of Symptoms: 13  Decadron: =Advised to take when she obtains medication  O2 Day: 2L   O2 Night: 2-3L  Next appointment: 1/12/2021  Time Spent: 10  Additional comments:  Advised to continue to take DM medications as ordered, and to schedule an additional FU appointment with her PCP for early next week just in case she needs to stay on oxygen. Advised to  dexamethasone and start taking today and educated regarding increased blood sugars when on medication so should be very diligent about a Diabetic diet. Advised to FU as scheduled with general surgeon for staple removal and to mention the bloody stool but it could just be related to the previous surgery for ulcer repair. Patient denies dizziness so I do not believe that she needs immediate care. Strict ER precations provided for patient    Supportive care and indications for immediate follow-up discussed with patient.    Pathogenesis of diagnosis discussed including typical length and natural progression. Patient expresses understanding and agrees to plan.              Please note that this dictation was created using voice recognition software. I have made every reasonable attempt to correct obvious errors, but I expect that there are errors of grammar and  possibly content that I did not discover before finalizing the note.    Note electronically signed by MARIO Kat

## 2021-01-12 ENCOUNTER — TELEMEDICINE (OUTPATIENT)
Dept: URGENT CARE | Facility: CLINIC | Age: 51
End: 2021-01-12
Payer: COMMERCIAL

## 2021-01-12 VITALS — OXYGEN SATURATION: 91 % | HEART RATE: 86 BPM | RESPIRATION RATE: 15 BRPM

## 2021-01-12 DIAGNOSIS — Z79.4 TYPE 2 DIABETES MELLITUS WITHOUT COMPLICATION, WITH LONG-TERM CURRENT USE OF INSULIN (HCC): ICD-10-CM

## 2021-01-12 DIAGNOSIS — Z99.81 SUPPLEMENTAL OXYGEN DEPENDENT: ICD-10-CM

## 2021-01-12 DIAGNOSIS — U07.1 PNEUMONIA DUE TO COVID-19 VIRUS: ICD-10-CM

## 2021-01-12 DIAGNOSIS — E11.9 TYPE 2 DIABETES MELLITUS WITHOUT COMPLICATION, WITH LONG-TERM CURRENT USE OF INSULIN (HCC): ICD-10-CM

## 2021-01-12 DIAGNOSIS — J12.82 PNEUMONIA DUE TO COVID-19 VIRUS: ICD-10-CM

## 2021-01-12 PROCEDURE — 99203 OFFICE O/P NEW LOW 30 MIN: CPT | Mod: 95 | Performed by: PHYSICIAN ASSISTANT

## 2021-01-12 ASSESSMENT — ENCOUNTER SYMPTOMS
DIZZINESS: 0
VOMITING: 0
FEVER: 0
DIARRHEA: 0
WHEEZING: 0
ABDOMINAL PAIN: 0
SHORTNESS OF BREATH: 1
COUGH: 0
PALPITATIONS: 0
CHILLS: 0
MYALGIAS: 0
HEADACHES: 0
SPUTUM PRODUCTION: 0

## 2021-01-12 NOTE — PROGRESS NOTES
Telemedicine Visit:      Subjective:   Maria Hamman is a 50 y.o. female presenting for evaluation and management of Covid-19 Home Monitoring     This evaluation was conducted via Zoom using secure and encrypted videoconferencing technology. The patient was in a private location in the [facility].    The patient's identity was confirmed and verbal consent was obtained for this virtual visit.    Home Monitoring Patient Triage  COVID-19 Monitoring Level: Home with basic monitoring       [facility] Flowsheet   1. Record COVID-19 Severity Index (qCSI):  4  2.Confirm appropriate patient and chart with two identifiers: Yes - continue to question #3   3. Days Since Onset of Symptoms: 14  4. Does patient have another adult at home to help take care of you: Yes - continue to question #5  5. Confirm O2 supply is working and there are no cannula problems: Yes - continue to question #6  6. Is your breathing today better or worse? Same  7. Are you able to tolerate fluids? Yes - Continue to question #8  8. Any vomiting or diarrhea in the last 24 hours? No - continue to question #9  9. Are you able to control your fevers? Yes - continue to question #10  10. Are you able to control your cough? Yes - continue to question #11  11. Current O2 Flow Rate: 3  12. Review ER precautions: present to ED or call 911 if experiencing severe shortness of breath: Yes  13. Confirm next VV: Appointment scheduled(Patient would like to change time of her appointment for tomorrow as she has post op follow up in office)  14. Final disposition: Stable at home  15. Time Spent: 15      Review of Systems   Constitutional: Positive for malaise/fatigue. Negative for chills and fever.   Respiratory: Positive for shortness of breath. Negative for cough, sputum production and wheezing.    Cardiovascular: Negative for chest pain and palpitations.   Gastrointestinal: Negative for abdominal pain, diarrhea and vomiting.   Musculoskeletal: Negative for  myalgias.   Neurological: Negative for dizziness and headaches.        She reports no overnight events.  Currently on 3 L oxygen day and night.  Denies chest pain, significant shortness of breath.  She is ambulating.  Eating and drinking normal.     Pneumonia  Improving shortness of breath and cough. The current episode started 1 to 4 weeks ago. The problem occurs constantly. The problem has been gradually improving.     Medications:    • ARIPiprazole Tabs  • ascorbic acid  • benzonatate Caps  • celecoxib Caps  • dexamethasone Tabs  • glimepiride Tabs  • Invokamet Tabs  • levothyroxine Tabs  • lisinopril Tabs  • omeprazole  • oxyCODONE immediate-release Tabs  • pregabalin Caps  • simvastatin Tabs  • Trulicity Sopn  • venlafaxine Tabs  • vitamin D Tabs  • zinc sulfate Caps    Allergies: Patient has no known allergies.    Problem List: Maria Hamman has COVID-19; Class 3 severe obesity in adult (HCC); DM (diabetes mellitus) ([facility]); Hypothyroid; Depression; and Acute respiratory failure with hypoxia ([facility]) on their problem list.    Surgical History:  Past Surgical History:   Procedure Laterality Date   • PB EXPLORATORY OF ABDOMEN  12/30/2020    Procedure: LAPAROTOMY, EXPLORATORY, with patch;  Surgeon: Ari Romero M.D.;  Location: SURGERY [facility];  Service: Vascular       Past Social Hx: Maria Hamman  reports that she has never smoked. She has never used smokeless tobacco.     Past Family Hx:  Maria Hamman family history is not on file.     Problem list, medications, and allergies reviewed by myself today in Epic.     Objective:   Pulse 86   Resp 15   SpO2 91%  (from [facility] home monitor)    Physical Exam:  Constitutional:       General: Awake.      Appearance: Not toxic-appearing.   HENT:      Nose: No signs of injury.      Mouth/Throat: Voice clear.     Lips: Pink.   Eyes:      General: Lids are normal.      Conjunctiva/sclera: Conjunctivae normal.   Neck:      Trachea: Phonation normal.   Pulmonary:       Effort: Pulmonary effort is normal.   Skin:     Coloration: Skin is not cyanotic or pale.   Neurological:      Mental Status: Alert and oriented to person, place, and time.   Psychiatric:         Mood and Affect: Mood and affect normal.         Speech: Speech normal.         Behavior: Behavior is cooperative.       Assessment and Plan:   The following treatment plan was discussed:     1. Pneumonia due to COVID-19 virus    2. Supplemental oxygen dependent    3. Type 2 diabetes mellitus without complication, with long-term current use of insulin (HCC)      CSI:4  Day of Symptoms: 14  Decadron: Started today  O2 Day: 3L   O2 Night: 3L  Next appointment: 1/13/2021  Time Spent: 15  Additional comments: Patient reports her supplemental oxygen was increased from 2 to 3 L yesterday.  She is feeling better today.  She denies worsening shortness of breath.  Patient is starting course of Decadron today.  Advised patient to monitor for increased glucose secondary to steroids.  Patient reports she has follow-up appointment for suture removal postop laparotomy in office tomorrow morning.  She will work with scheduling to change her RPM follow-up appointment tomorrow.    Supportive care and indications for immediate follow-up discussed with patient. Pathogenesis of diagnosis discussed including typical length and natural progression. Patient expresses understanding and agrees to plan.            Please note that this dictation was created using voice recognition software. I have made every reasonable attempt to correct obvious errors, but I expect that there are errors of grammar and possibly content that I did not discover before finalizing the note.     Note electronically signed by Kim Seymour PA-C

## 2021-01-13 ENCOUNTER — TELEMEDICINE (OUTPATIENT)
Dept: URGENT CARE | Facility: CLINIC | Age: 51
End: 2021-01-13
Payer: COMMERCIAL

## 2021-01-13 DIAGNOSIS — Z99.81 SUPPLEMENTAL OXYGEN DEPENDENT: ICD-10-CM

## 2021-01-13 DIAGNOSIS — E11.9 TYPE 2 DIABETES MELLITUS WITHOUT COMPLICATION, WITH LONG-TERM CURRENT USE OF INSULIN (HCC): ICD-10-CM

## 2021-01-13 DIAGNOSIS — U07.1 PNEUMONIA DUE TO COVID-19 VIRUS: ICD-10-CM

## 2021-01-13 DIAGNOSIS — J12.82 PNEUMONIA DUE TO COVID-19 VIRUS: ICD-10-CM

## 2021-01-13 DIAGNOSIS — Z79.4 TYPE 2 DIABETES MELLITUS WITHOUT COMPLICATION, WITH LONG-TERM CURRENT USE OF INSULIN (HCC): ICD-10-CM

## 2021-01-13 PROCEDURE — 99213 OFFICE O/P EST LOW 20 MIN: CPT | Mod: 95 | Performed by: NURSE PRACTITIONER

## 2021-01-14 ENCOUNTER — TELEMEDICINE (OUTPATIENT)
Dept: URGENT CARE | Facility: CLINIC | Age: 51
End: 2021-01-14
Payer: COMMERCIAL

## 2021-01-14 VITALS — RESPIRATION RATE: 16 BRPM | OXYGEN SATURATION: 94 % | TEMPERATURE: 98.1 F | HEART RATE: 76 BPM

## 2021-01-14 DIAGNOSIS — J12.82 PNEUMONIA DUE TO COVID-19 VIRUS: ICD-10-CM

## 2021-01-14 DIAGNOSIS — U07.1 COVID-19: ICD-10-CM

## 2021-01-14 DIAGNOSIS — E08.65 DIABETES MELLITUS DUE TO UNDERLYING CONDITION WITH HYPERGLYCEMIA, WITHOUT LONG-TERM CURRENT USE OF INSULIN (HCC): ICD-10-CM

## 2021-01-14 DIAGNOSIS — J96.01 ACUTE RESPIRATORY FAILURE WITH HYPOXIA (HCC): ICD-10-CM

## 2021-01-14 DIAGNOSIS — U07.1 PNEUMONIA DUE TO COVID-19 VIRUS: ICD-10-CM

## 2021-01-14 PROCEDURE — 99458 RPM TX MGMT EA ADDL 20 MIN: CPT | Mod: 95 | Performed by: FAMILY MEDICINE

## 2021-01-14 NOTE — PROGRESS NOTES
"  Virtual Visit: Established Patient   This evaluation was conducted via {platform:62827} using secure and encrypted videoconferencing technology. The patient was in a private location in the state of {State:[address]::\"[place]\"}.    The patient's identity was confirmed and verbal consent was obtained for this virtual visit.    Subjective:   CC:   Chief Complaint   Patient presents with   • Pneumonia   • Covid-19 Home Monitoring Video Visit       Maria Hamman is a 50 y.o. female presenting for evaluation and management of:    ***    Home Monitoring Patient Triage  COVID-19 Monitoring Level:         [facility] Flowsheet   1. Record COVID-19 Severity Index (qCSI):     2.Confirm appropriate patient and chart with two identifiers:     3. Days Since Onset of Symptoms:    4. Does patient have another adult at home to help take care of you:    5. Confirm O2 supply is working and there are no cannula problems:    6. Is your breathing today better or worse?    7. Are you able to tolerate fluids?    8. Any vomiting or diarrhea in the last 24 hours?    9. Are you able to control your fevers?    10. Are you able to control your cough?    11. Current O2 Flow Rate:    12. Review ER precautions: present to ED or call 911 if experiencing severe shortness of breath:    13. Confirm next VV:    14. Final disposition:    15. Time Spent:      ROS ***  Denies any recent fevers or chills. No nausea or vomiting. No chest pains or shortness of breath.     No Known Allergies    Current medicines (including changes today)  Current Outpatient Medications   Medication Sig Dispense Refill   • benzonatate (TESSALON) 100 MG Cap Take 1 Cap by mouth 3 times a day as needed for Cough. 60 Cap 0   • ascorbic acid (VITAMIN C) 1000 MG tablet Take 1 Tab by mouth 2 Times a Day. 30 Tab 0   • omeprazole (PRILOSEC) 20 MG delayed-release capsule Take 1 Cap by mouth 2 Times a Day. 60 Cap 0   • vitamin D (VITAMIND D3) 1000 UNIT Tab Take 1 Tab by mouth every " day. 60 Tab 0   • zinc sulfate (ZINCATE) 220 (50 Zn) MG Cap Take 1 Cap by mouth every day. 30 Cap 0   • dexamethasone (DECADRON) 6 MG Tab Take 1 Tab by mouth every day for 7 days. 7 Tab 0   • lisinopril (PRINIVIL) 10 MG Tab Take 10 mg by mouth every day. Indications: High Blood Pressure Disorder     • levothyroxine (SYNTHROID) 75 MCG Tab Take 75 mcg by mouth Every morning on an empty stomach. Indications: Underactive Thyroid     • simvastatin (ZOCOR) 10 MG Tab Take 10 mg by mouth every evening.     • Canagliflozin-metFORMIN HCl (INVOKAMET) 150-1000 MG Tab Take 1 Tab by mouth 2 Times a Day.     • celecoxib (CELEBREX) 200 MG Cap Take 200 mg by mouth every day.     • glimepiride (AMARYL) 4 MG Tab Take 4 mg by mouth every morning.     • ARIPiprazole (ABILIFY) 10 MG Tab Take 10 mg by mouth every day.     • venlafaxine (EFFEXOR) 75 MG Tab Take 75 mg by mouth 2 Times a Day.     • pregabalin (LYRICA) 150 MG Cap Take 150 mg by mouth 2 times a day.     • Dulaglutide (TRULICITY) 0.75 MG/0.5ML Solution Pen-injector Inject 1 mL under the skin every 7 days.       No current facility-administered medications for this visit.        Patient Active Problem List    Diagnosis Date Noted   • COVID-19 12/31/2020     Priority: Medium   • Acute respiratory failure with hypoxia ([facility]) 01/05/2021   • Depression 01/04/2021   • Hypothyroid 01/03/2021   • Class 3 severe obesity in adult ([facility]) 12/31/2020   • DM (diabetes mellitus) ([facility]) 12/31/2020       No family history on file.    She  has no past medical history on file.  She  has a past surgical history that includes pr exploratory of abdomen (12/30/2020).       Objective:   Pulse 76   Temp 36.7 °C (98.1 °F)   Resp 16   SpO2 94%     Physical Exam:***  Constitutional: Alert, no distress, well-groomed.  Skin: No rashes in visible areas.  Eye: Round. Conjunctiva clear, lids normal. No icterus.   ENMT: Lips pink without lesions, good dentition, moist mucous membranes. Phonation normal.  Neck:  No masses, no thyromegaly. Moves freely without pain.  Respiratory: Unlabored respiratory effort, no cough or audible wheeze  Psych: Alert and oriented x3, normal affect and mood.       Assessment and Plan:   The following treatment plan was discussed:     There are no diagnoses linked to this encounter.      Follow-up: No follow-ups on file.

## 2021-01-14 NOTE — PROGRESS NOTES
This evaluation was conducted via Zoom using secure and encrypted videoconferencing technology. The patient was in a private location in the state [facility].    The patient's identity was confirmed and verbal consent was obtained for this virtual visit.    Home Monitoring Patient Triage  COVID-19 Monitoring Level:  Home monitoring program       [facility] Flowsheet   1. Record COVID-19 Severity Index (qCSI):  6  2.Confirm appropriate patient and chart with two identifiers: Yes - continue to question #3   3. Days Since Onset of Symptoms: 15  4. Does patient have another adult at home to help take care of you: Yes - continue to question #5  5. Confirm O2 supply is working and there are no cannula problems: Yes - continue to question #6  6. Is your breathing today better or worse? Same  7. Are you able to tolerate fluids? Yes - Continue to question #8  8. Any vomiting or diarrhea in the last 24 hours? No - continue to question #9  9. Are you able to control your fevers? N/A  10. Are you able to control your cough? Yes - continue to question #11  11. Current O2 Flow Rate: 3  12. Review ER precautions: present to ED or call 911 if experiencing severe shortness of breath: Yes  13. Confirm next VV: Appointment scheduled  14. Final disposition: Stable at home  15. Time Spent: 20    Maria Hamman is a 50 year old female who presents virtually for follow-up regarding recent hospital admission for Covid and home oxygen monitoring program.  Since her visit yesterday, has had some setbacks after her visit yesterday.  States that she decided to the low 80s, and had to increase her oxygen to 4 L.  Additionally, she was having monitor issues, of which was corrected at 10 AM this morning.  Currently, she is on 3 L of oxygen and doing well.      ROS   Denies any recent fevers or chills. No nausea or vomiting. No chest pains or shortness of breath.     No Known Allergies    Current medicines (including changes today)  Current  Outpatient Medications   Medication Sig Dispense Refill   • benzonatate (TESSALON) 100 MG Cap Take 1 Cap by mouth 3 times a day as needed for Cough. 60 Cap 0   • ascorbic acid (VITAMIN C) 1000 MG tablet Take 1 Tab by mouth 2 Times a Day. 30 Tab 0   • omeprazole (PRILOSEC) 20 MG delayed-release capsule Take 1 Cap by mouth 2 Times a Day. 60 Cap 0   • vitamin D (VITAMIND D3) 1000 UNIT Tab Take 1 Tab by mouth every day. 60 Tab 0   • zinc sulfate (ZINCATE) 220 (50 Zn) MG Cap Take 1 Cap by mouth every day. 30 Cap 0   • dexamethasone (DECADRON) 6 MG Tab Take 1 Tab by mouth every day for 7 days. 7 Tab 0   • lisinopril (PRINIVIL) 10 MG Tab Take 10 mg by mouth every day. Indications: High Blood Pressure Disorder     • levothyroxine (SYNTHROID) 75 MCG Tab Take 75 mcg by mouth Every morning on an empty stomach. Indications: Underactive Thyroid     • simvastatin (ZOCOR) 10 MG Tab Take 10 mg by mouth every evening.     • Canagliflozin-metFORMIN HCl (INVOKAMET) 150-1000 MG Tab Take 1 Tab by mouth 2 Times a Day.     • celecoxib (CELEBREX) 200 MG Cap Take 200 mg by mouth every day.     • glimepiride (AMARYL) 4 MG Tab Take 4 mg by mouth every morning.     • ARIPiprazole (ABILIFY) 10 MG Tab Take 10 mg by mouth every day.     • venlafaxine (EFFEXOR) 75 MG Tab Take 75 mg by mouth 2 Times a Day.     • pregabalin (LYRICA) 150 MG Cap Take 150 mg by mouth 2 times a day.     • Dulaglutide (TRULICITY) 0.75 MG/0.5ML Solution Pen-injector Inject 1 mL under the skin every 7 days.       No current facility-administered medications for this visit.        Patient Active Problem List    Diagnosis Date Noted   • COVID-19 12/31/2020     Priority: Medium   • Acute respiratory failure with hypoxia ([facility]) 01/05/2021   • Depression 01/04/2021   • Hypothyroid 01/03/2021   • Class 3 severe obesity in adult ([facility]) 12/31/2020   • DM (diabetes mellitus) ([facility]) 12/31/2020       History reviewed. No pertinent family history.    She  has no past medical history on  file.  She  has a past surgical history that includes pr exploratory of abdomen (12/30/2020).       Objective:   SpO2: 93% on 4L   RR: 17  (taken from [facility] program)    Physical Exam:  Constitutional: Alert, no distress, well-groomed.  Skin: No rashes in visible areas.  Eye: Round. Conjunctiva clear, lids normal. No icterus.   ENMT: Lips pink without lesions, good dentition, moist mucous membranes. Phonation normal.  Nasal cannula in place.  Neck: No masses, no thyromegaly. Moves freely without pain.  Respiratory: Unlabored respiratory effort, no cough or audible wheeze  Psych: Alert and oriented x3, normal affect and mood.       Assessment and Plan:   The following treatment plan was discussed:     1. Pneumonia due to COVID-19 virus    2. Supplemental oxygen dependent    3. Type 2 diabetes mellitus without complication, with long-term current use of insulin (HCC)    -Continue home monitoring  - Patient is currently on 3L of oxygen, will wait to further titrate lower as she had several hypoxic events yesterday, continue to monitor oxygen and respiratory rate  -Discussed importance of frequent breathing exercises throughout the day   - Encouraged increased fluids and rest   -Continue present medications as discussed  - Follow up virtually tomorrow  -Patient was reassured and educated to monitor for any sudden changes in symptoms that may cause dizziness, weakness, dyspnea with O2, inability to take in fluids, chest pain, heart racing, etc.  Should this occur, the patient understands that they must go back to ED for further evaluation.  All questions answered and they agree to plan of care.

## 2021-01-14 NOTE — PROGRESS NOTES
Virtual Visit: Established Patient   This evaluation was conducted via Zoom using secure and encrypted videoconferencing technology. The patient was in a private location in the state of [place].    The patient's identity was confirmed and verbal consent was obtained for this virtual visit.    Subjective:   CC:   Chief Complaint   Patient presents with   • Pneumonia   • Covid-19 Home Monitoring Video Visit       Maria Hamman is a 50 y.o. female presenting for evaluation and management of:    HPI:    Currently on 4L    Slept well last night, but continues to have dyspnea, but only with exertion.      DM-II:    On glimepiride, Invokamet.   denies sx hyper/hypoglycemia.   Not routinely checking BS    Home Monitoring Patient Triage  COVID-19 Monitoring Level: Monitoring no longer needed       [facility] Flowsheet   1. Record COVID-19 Severity Index (qCSI):  4  2.Confirm appropriate patient and chart with two identifiers: Yes - continue to question #3   3. Days Since Onset of Symptoms: 16  4. Does patient have another adult at home to help take care of you: Yes - continue to question #5  5. Confirm O2 supply is working and there are no cannula problems: Yes - continue to question #6  6. Is your breathing today better or worse? Better - continue to question #7  7. Are you able to tolerate fluids? Yes - Continue to question #8  8. Any vomiting or diarrhea in the last 24 hours? No - continue to question #9  9. Are you able to control your fevers? Yes - continue to question #10  10. Are you able to control your cough?    11. Current O2 Flow Rate:    12. Review ER precautions: present to ED or call 911 if experiencing severe shortness of breath: Yes  13. Confirm next VV: N/A  14. Final disposition: Discharge from Program  15. Time Spent: 20    ROS    Denies any recent fevers or chills. No nausea or vomiting. No chest pains or shortness of breath.     No Known Allergies    Current medicines (including changes  today)  Current Outpatient Medications   Medication Sig Dispense Refill   • benzonatate (TESSALON) 100 MG Cap Take 1 Cap by mouth 3 times a day as needed for Cough. 60 Cap 0   • ascorbic acid (VITAMIN C) 1000 MG tablet Take 1 Tab by mouth 2 Times a Day. 30 Tab 0   • omeprazole (PRILOSEC) 20 MG delayed-release capsule Take 1 Cap by mouth 2 Times a Day. 60 Cap 0   • vitamin D (VITAMIND D3) 1000 UNIT Tab Take 1 Tab by mouth every day. 60 Tab 0   • zinc sulfate (ZINCATE) 220 (50 Zn) MG Cap Take 1 Cap by mouth every day. 30 Cap 0   • dexamethasone (DECADRON) 6 MG Tab Take 1 Tab by mouth every day for 7 days. 7 Tab 0   • lisinopril (PRINIVIL) 10 MG Tab Take 10 mg by mouth every day. Indications: High Blood Pressure Disorder     • levothyroxine (SYNTHROID) 75 MCG Tab Take 75 mcg by mouth Every morning on an empty stomach. Indications: Underactive Thyroid     • simvastatin (ZOCOR) 10 MG Tab Take 10 mg by mouth every evening.     • Canagliflozin-metFORMIN HCl (INVOKAMET) 150-1000 MG Tab Take 1 Tab by mouth 2 Times a Day.     • celecoxib (CELEBREX) 200 MG Cap Take 200 mg by mouth every day.     • glimepiride (AMARYL) 4 MG Tab Take 4 mg by mouth every morning.     • ARIPiprazole (ABILIFY) 10 MG Tab Take 10 mg by mouth every day.     • venlafaxine (EFFEXOR) 75 MG Tab Take 75 mg by mouth 2 Times a Day.     • pregabalin (LYRICA) 150 MG Cap Take 150 mg by mouth 2 times a day.     • Dulaglutide (TRULICITY) 0.75 MG/0.5ML Solution Pen-injector Inject 1 mL under the skin every 7 days.       No current facility-administered medications for this visit.        Patient Active Problem List    Diagnosis Date Noted   • COVID-19 12/31/2020     Priority: Medium   • Acute respiratory failure with hypoxia (HCC) 01/05/2021   • Depression 01/04/2021   • Hypothyroid 01/03/2021   • Class 3 severe obesity in adult ([facility]) 12/31/2020   • DM (diabetes mellitus) ([facility]) 12/31/2020       No family history on file.    She  has no past medical history on  file.  She  has a past surgical history that includes pr exploratory of abdomen (12/30/2020).       Objective:   Pulse 76   Temp 36.7 °C (98.1 °F)   Resp 16   SpO2 94%     Physical Exam:  Constitutional: Alert, no distress, well-groomed.  Skin: No rashes in visible areas.  Eye: Round. Conjunctiva clear, lids normal. No icterus.   ENMT: Lips pink without lesions, good dentition, moist mucous membranes. Phonation normal.  Neck: No masses, no thyromegaly. Moves freely without pain.  Respiratory: Unlabored respiratory effort, no cough or audible wheeze  Psych: Alert and oriented x3, normal affect and mood.       Assessment and Plan:   1. Pneumonia due to COVID-19 virus     2. Supplemental oxygen dependent     3. Type 2 diabetes mellitus without complication, with long-term current use of insulin (HCC)     -discontinue home monitoring  - Patient is currently on 3-4L of oxygen, no hypoxic events yesterday .   Continue on 3-4L, O2> 89%  -Discussed importance of frequent breathing exercises throughout the day      -Continue present medications as discussed  - Follow up with PCP,   -urgent referral to pulm placed  -Patient was reassured and educated to monitor for any sudden changes in symptoms that may cause dizziness, weakness, dyspnea with O2, inability to take in fluids, chest pain, heart racing, etc.  Should this occur, the patient understands that they must go back to ED for further evaluation.  All questions answered and they agree to plan of care.

## 2021-01-14 NOTE — PROGRESS NOTES
Subjective:   Maria Hamman is a 50 y.o. female who presents for No chief complaint on file.       HPI  Pt presents for evaluation of a new problem, reports ***    ROS    MEDS:   Current Outpatient Medications:   •  benzonatate (TESSALON) 100 MG Cap, Take 1 Cap by mouth 3 times a day as needed for Cough., Disp: 60 Cap, Rfl: 0  •  ascorbic acid (VITAMIN C) 1000 MG tablet, Take 1 Tab by mouth 2 Times a Day., Disp: 30 Tab, Rfl: 0  •  omeprazole (PRILOSEC) 20 MG delayed-release capsule, Take 1 Cap by mouth 2 Times a Day., Disp: 60 Cap, Rfl: 0  •  vitamin D (VITAMIND D3) 1000 UNIT Tab, Take 1 Tab by mouth every day., Disp: 60 Tab, Rfl: 0  •  zinc sulfate (ZINCATE) 220 (50 Zn) MG Cap, Take 1 Cap by mouth every day., Disp: 30 Cap, Rfl: 0  •  dexamethasone (DECADRON) 6 MG Tab, Take 1 Tab by mouth every day for 7 days., Disp: 7 Tab, Rfl: 0  •  lisinopril (PRINIVIL) 10 MG Tab, Take 10 mg by mouth every day. Indications: High Blood Pressure Disorder, Disp: , Rfl:   •  levothyroxine (SYNTHROID) 75 MCG Tab, Take 75 mcg by mouth Every morning on an empty stomach. Indications: Underactive Thyroid, Disp: , Rfl:   •  simvastatin (ZOCOR) 10 MG Tab, Take 10 mg by mouth every evening., Disp: , Rfl:   •  Canagliflozin-metFORMIN HCl (INVOKAMET) 150-1000 MG Tab, Take 1 Tab by mouth 2 Times a Day., Disp: , Rfl:   •  celecoxib (CELEBREX) 200 MG Cap, Take 200 mg by mouth every day., Disp: , Rfl:   •  glimepiride (AMARYL) 4 MG Tab, Take 4 mg by mouth every morning., Disp: , Rfl:   •  ARIPiprazole (ABILIFY) 10 MG Tab, Take 10 mg by mouth every day., Disp: , Rfl:   •  venlafaxine (EFFEXOR) 75 MG Tab, Take 75 mg by mouth 2 Times a Day., Disp: , Rfl:   •  pregabalin (LYRICA) 150 MG Cap, Take 150 mg by mouth 2 times a day., Disp: , Rfl:   •  Dulaglutide (TRULICITY) 0.75 MG/0.5ML Solution Pen-injector, Inject 1 mL under the skin every 7 days., Disp: , Rfl:   ALLERGIES: No Known Allergies    Patient's PMH, SocHx, SurgHx, FamHx, Drug allergies and  medications were reviewed.     Objective:   There were no vitals taken for this visit.    Physical Exam    Assessment/Plan:   Assessment    There are no diagnoses linked to this encounter.  No follow-ups on file.

## 2021-01-15 ENCOUNTER — TELEMEDICINE (OUTPATIENT)
Dept: URGENT CARE | Facility: CLINIC | Age: 51
End: 2021-01-15
Payer: COMMERCIAL

## 2021-01-15 VITALS — RESPIRATION RATE: 21 BRPM | OXYGEN SATURATION: 95 % | HEART RATE: 83 BPM

## 2021-01-15 DIAGNOSIS — Z79.4 TYPE 2 DIABETES MELLITUS WITHOUT COMPLICATION, WITH LONG-TERM CURRENT USE OF INSULIN (HCC): ICD-10-CM

## 2021-01-15 DIAGNOSIS — E11.9 TYPE 2 DIABETES MELLITUS WITHOUT COMPLICATION, WITH LONG-TERM CURRENT USE OF INSULIN (HCC): ICD-10-CM

## 2021-01-15 DIAGNOSIS — U07.1 PNEUMONIA DUE TO COVID-19 VIRUS: ICD-10-CM

## 2021-01-15 DIAGNOSIS — J12.82 PNEUMONIA DUE TO COVID-19 VIRUS: ICD-10-CM

## 2021-01-15 DIAGNOSIS — U07.1 COVID-19: ICD-10-CM

## 2021-01-15 DIAGNOSIS — E66.01 CLASS 3 OBESITY (HCC): ICD-10-CM

## 2021-01-15 DIAGNOSIS — Z99.81 SUPPLEMENTAL OXYGEN DEPENDENT: ICD-10-CM

## 2021-01-15 PROCEDURE — 99457 RPM TX MGMT 1ST 20 MIN: CPT | Mod: 95 | Performed by: PHYSICIAN ASSISTANT

## 2021-01-15 ASSESSMENT — ENCOUNTER SYMPTOMS
ABDOMINAL PAIN: 0
CONSTIPATION: 0
VOMITING: 0
SHORTNESS OF BREATH: 0
DIARRHEA: 0
FEVER: 0
NAUSEA: 0
CHILLS: 0
COUGH: 0

## 2021-01-15 NOTE — PROGRESS NOTES
Subjective:   Maria Hamman is a 50 y.o. female who presents for Covid-19 Home Monitoring Video Visit    This evaluation was conducted via Zoom using secure and encrypted videoconferencing technology. The patient was in a private location in the [facility].    The patient's identity was confirmed and verbal consent was obtained for this virtual visit.      HPI      Current O2 flow rate:  3L  Symptom improvement: Yes   On Dexamethasone: no     Home Monitoring Patient Triage  COVID-19 Monitoring Level:   Discharged from home monitoring program yesterday. Requested to be seen today.     [facility] Flowsheet   1. Record COVID-19 Severity Index (qCSI):     2.Confirm appropriate patient and chart with two identifiers:   yes  3. Days Since Onset of Symptoms:  17  4. Does patient have another adult at home to help take care of you:  yes  5. Confirm O2 supply is working and there are no cannula problems:  yes  6. Is your breathing today better or worse? better  7. Are you able to tolerate fluids?  yes  8. Any vomiting or diarrhea in the last 24 hours?  no  9. Are you able to control your fevers?  yes  10. Are you able to control your cough?  yes  11. Current O2 Flow Rate:  3  12. Review ER precautions: present to ED or call 911 if experiencing severe shortness of breath:  reviewed  13. Confirm next VV:  primary care 1/22/21  14. Final disposition:   stable at home  15. Time Spent:  15 minutes     Review of Systems   Constitutional: Negative for chills, fever and malaise/fatigue.   Respiratory: Negative for cough and shortness of breath.    Gastrointestinal: Negative for abdominal pain, constipation, diarrhea, nausea and vomiting.   All other systems reviewed and are negative.      PMH:  has no past medical history on file.  MEDS:   Current Outpatient Medications:   •  benzonatate (TESSALON) 100 MG Cap, Take 1 Cap by mouth 3 times a day as needed for Cough., Disp: 60 Cap, Rfl: 0  •  ascorbic acid (VITAMIN C) 1000  MG tablet, Take 1 Tab by mouth 2 Times a Day., Disp: 30 Tab, Rfl: 0  •  omeprazole (PRILOSEC) 20 MG delayed-release capsule, Take 1 Cap by mouth 2 Times a Day., Disp: 60 Cap, Rfl: 0  •  vitamin D (VITAMIND D3) 1000 UNIT Tab, Take 1 Tab by mouth every day., Disp: 60 Tab, Rfl: 0  •  zinc sulfate (ZINCATE) 220 (50 Zn) MG Cap, Take 1 Cap by mouth every day., Disp: 30 Cap, Rfl: 0  •  dexamethasone (DECADRON) 6 MG Tab, Take 1 Tab by mouth every day for 7 days., Disp: 7 Tab, Rfl: 0  •  lisinopril (PRINIVIL) 10 MG Tab, Take 10 mg by mouth every day. Indications: High Blood Pressure Disorder, Disp: , Rfl:   •  levothyroxine (SYNTHROID) 75 MCG Tab, Take 75 mcg by mouth Every morning on an empty stomach. Indications: Underactive Thyroid, Disp: , Rfl:   •  simvastatin (ZOCOR) 10 MG Tab, Take 10 mg by mouth every evening., Disp: , Rfl:   •  Canagliflozin-metFORMIN HCl (INVOKAMET) 150-1000 MG Tab, Take 1 Tab by mouth 2 Times a Day., Disp: , Rfl:   •  celecoxib (CELEBREX) 200 MG Cap, Take 200 mg by mouth every day., Disp: , Rfl:   •  glimepiride (AMARYL) 4 MG Tab, Take 4 mg by mouth every morning., Disp: , Rfl:   •  ARIPiprazole (ABILIFY) 10 MG Tab, Take 10 mg by mouth every day., Disp: , Rfl:   •  venlafaxine (EFFEXOR) 75 MG Tab, Take 75 mg by mouth 2 Times a Day., Disp: , Rfl:   •  pregabalin (LYRICA) 150 MG Cap, Take 150 mg by mouth 2 times a day., Disp: , Rfl:   •  Dulaglutide (TRULICITY) 0.75 MG/0.5ML Solution Pen-injector, Inject 1 mL under the skin every 7 days., Disp: , Rfl:   ALLERGIES: No Known Allergies  SURGHX:   Past Surgical History:   Procedure Laterality Date   • PB EXPLORATORY OF ABDOMEN  12/30/2020    Procedure: LAPAROTOMY, EXPLORATORY, with patch;  Surgeon: Ari Romero M.D.;  Location: SURGERY [facility];  Service: Vascular     SOCHX:  reports that she has never smoked. She has never used smokeless tobacco.  History reviewed. No pertinent family history.     Objective:   Pulse 83   Resp (!) 21   SpO2  95%   Physical Exam:  Constitutional: Alert, no distress, well-groomed.  Skin: No rashes in visible areas.  Eye: Round. Conjunctiva clear, lids normal. No icterus.   ENMT: Lips pink without lesions, good dentition, moist mucous membranes. Phonation normal.  Neck: No masses, no thyromegaly. Moves freely without pain.  CV: Pulse as reported by patient  Respiratory: Unlabored respiratory effort, no cough or audible wheeze  Psych: Alert and oriented x3, normal affect and mood.         Assessment/Plan:     1. Pneumonia due to COVID-19 virus     2. COVID-19     3. Class 3 obesity     4. Type 2 diabetes mellitus without complication, with long-term current use of insulin (HCC)     5. Supplemental oxygen dependent           - Patient is currently on 3 L oxygen supplementation with adequate oxygen >93%. Encouraged to attempt to wean to 2 L by tomorrow   - Encouraged increased fluids and rest, discussed continued breathing exercises   - ED precautions discussed  - Follow up as schedule with pcp. She can follow up on telemedicine if anything changes before pcp visit.     Please note that this dictation was created using voice recognition software. I have made every reasonable attempt to correct obvious errors, but I expect that there are errors of grammar and possibly content that I did not discover before finalizing the note.

## 2021-01-16 ENCOUNTER — APPOINTMENT (OUTPATIENT)
Dept: URGENT CARE | Facility: CLINIC | Age: 51
End: 2021-01-16
Payer: COMMERCIAL

## 2021-01-22 ENCOUNTER — DOCUMENTATION (OUTPATIENT)
Dept: SLEEP MEDICINE | Facility: MEDICAL CENTER | Age: 51
End: 2021-01-22

## 2021-01-22 NOTE — PROGRESS NOTES
Patient left voice message that she was looking for New Pulmonary appointment.  I called her back to get her scheduled as there was a referral in the system.    Patient states she was wanting to establish with [facility] and not with [facility]. Patient said her PCP in [place] was placing the referral ? And she will be able to get herself scheduled with that group.    Nothing from [facility] needed today.

## 2021-01-24 NOTE — DOCUMENTATION QUERY
[facility]                                                                       Query Response Note      PATIENT:               HAMMAN, MARIA  ACCT #:                  [id number]  MRN:                     [id number]  :                      1970  ADMIT DATE:       2020 7:52 PM  DISCH DATE:        2021 5:51 PM  RESPONDING  PROVIDER #:        [id number]           QUERY TEXT:    This patient presented with a perforated marginal ulcer of Jf-en-Y gastric bypass.  The  is unable to determine if this a complication of the prior gastric bypass surgery with current provider documentation.  Please clarify this condition.    NOTE:  If an appropriate response is not listed below, please respond with a new note.    Homa Martínez CCS  Coding   [email or web address]              Documentation indicators that raised basis for question:   CLINICAL INDICATORS:  PER 20 SURGICAL CONSULT NOTE:  HISTORY OF PRESENT ILLNESS:   50-year-old female with a history of a Jf-en-Y gastric bypass performed in the , had acute onset of diffuse abdominal pain earlier today and went to an outside ER where CT scan showed pneumoperitoneum and she was transferred here.     PER OP REPORT:  POSTOPERATIVE DIAGNOSES:  1.  Perforated marginal ulcer of Jf-en-Y gastric bypass.    PER 21 PN:  Assessment/Plan  * Septic shock (HCC)- (present on admission)  Assessment & Plan  This is Septic shock Present on admission  SIRS criteria identified on my evaluation include: Fever, with temperature greater than 101 deg F  Source is perforated ulcer    Perforated ulcer (HCC)- (present on admission)  Assessment & Plan  Status post Valentin Patch by Dr. Romero on  and was cleared for a clear liquid diet on 1/3    TREATMENT:  Exploratory laparotomy with modified Valentin patch of perforated marginal ulcer; IV Zosyn and IV fluids    RELATED  CONDITIONS:  septic shock due perforated ulcer; BMI 54; DM, LAN  Options provided:   -- Condition perforated marginal ulcer is unexpected and a complication of the prior procedure performed   -- Condition perforated marginal ulcer is not a complication due to or associated with the prior procedure   -- Condition perforated marginal ulcer is routinely expected and integral/inherent to the prior procedure performed   -- Condition perforated marginal ulcer is related to another etiology, (please document underlying etiology)   -- Unable to determine      Query created by: Homa Martínez on 1/20/2021 7:03 AM    RESPONSE TEXT:    Unable to determine          Electronically signed by:  BELEM RODRÍGUEZ MD 1/24/2021 12:32 PM

## 2022-12-30 ENCOUNTER — OFFICE VISIT (OUTPATIENT)
Dept: URBAN - METROPOLITAN AREA CLINIC 23 | Facility: CLINIC | Age: 52
End: 2022-12-30
Payer: COMMERCIAL

## 2022-12-30 DIAGNOSIS — E11.3293 TYPE 2 DIAB W MILD NONPRLF DIABETIC RTNOP W/O MACULAR EDEMA, BILATERAL: Primary | ICD-10-CM

## 2022-12-30 PROCEDURE — 99203 OFFICE O/P NEW LOW 30 MIN: CPT | Performed by: OPTOMETRIST

## 2022-12-30 ASSESSMENT — INTRAOCULAR PRESSURE
OS: 18
OD: 15

## 2022-12-30 ASSESSMENT — KERATOMETRY
OS: 45.38
OD: 45.50

## 2022-12-30 NOTE — IMPRESSION/PLAN
Impression: Type 2 diab w mild nonprlf diabetic rtnop w/o macular edema, bilateral: C48.7415. Bilateral. Plan: Pt edu. OPTOS photos ordered and reviewed. Few exudates/drusen noted temp to macula, no signs of CME or christie today. Monitor annually and continue good blood sugar control, monitor annually.

## (undated) DEVICE — CLIP MED LG INTNL HRZN TI ESCP - (20/BX)

## (undated) DEVICE — SUTURE 1 VICRYL PLUS CTX - 36 INCH (36/BX)

## (undated) DEVICE — SUTURE 3-0 VICRYL PLUS SH - 8X 18 INCH (12/BX)

## (undated) DEVICE — GLOVE BIOGEL SZ 7 SURGICAL PF LTX - (50PR/BX 4BX/CA)

## (undated) DEVICE — STAPLER SKIN DISP - (6/BX 10BX/CA) VISISTAT

## (undated) DEVICE — CLIP MED INTNL HRZN TI ESCP - (25/BX)

## (undated) DEVICE — SET LEADWIRE 5 LEAD BEDSIDE DISPOSABLE ECG (1SET OF 5/EA)

## (undated) DEVICE — SET EXTENSION WITH 2 PORTS (48EA/CA) ***PART #2C8610 IS A SUBSTITUTE*****

## (undated) DEVICE — SODIUM CHL IRRIGATION 0.9% 1000ML (12EA/CA)

## (undated) DEVICE — SUTURE 2-0 COATED VICRYL PLUS - 12 X 18 INCH (12/BX)

## (undated) DEVICE — KIT EVACUATER 3 SPRING PVC LF 1/8 DRAIN SIZE (10EA/CA)"

## (undated) DEVICE — NEPTUNE 4 PORT MANIFOLD - (20/PK)

## (undated) DEVICE — SUTURE 2-0 VICRYL PLUS SH - 8 X 18 INCH (12/BX)

## (undated) DEVICE — CLIP LG INTNL HRZN TI ESCP LGT - (20/BX)

## (undated) DEVICE — GOWN SURGEONS X-LARGE - DISP. (30/CA)

## (undated) DEVICE — ELECTRODE DUAL RETURN W/ CORD - (50/PK)

## (undated) DEVICE — SUTURE 0 COATED VICRYL 6-18IN - (12PK/BX)

## (undated) DEVICE — TRAY MULTI-LUMEN 7FR PRESSURE W/MAX BARRIER AND BIOPATCH - (5/CA)

## (undated) DEVICE — SLEEVE, VASO, THIGH, MED

## (undated) DEVICE — CHLORAPREP 26 ML APPLICATOR - ORANGE TINT(25/CA)

## (undated) DEVICE — HEAD HOLDER JUNIOR/ADULT

## (undated) DEVICE — ELECTRODE 850 FOAM ADHESIVE - HYDROGEL RADIOTRNSPRNT (50/PK)

## (undated) DEVICE — TUBING CLEARLINK DUO-VENT - C-FLO (48EA/CA)

## (undated) DEVICE — SUTURE GENERAL

## (undated) DEVICE — LACTATED RINGERS INJ 1000 ML - (14EA/CA 60CA/PF)

## (undated) DEVICE — LIGASURE TISSUE FUSION  - SINGLE USE (6/CA)

## (undated) DEVICE — TUBE CONNECT SUCTION CLEAR 120 X 1/4" (50EA/CA)"

## (undated) DEVICE — SENSOR SPO2 NEO LNCS ADHESIVE (20/BX) SEE USER NOTES

## (undated) DEVICE — PACK MAJOR BASIN - (2EA/CA)

## (undated) DEVICE — DRAPE MAYO STAND - (30/CA)

## (undated) DEVICE — KIT ANESTHESIA W/CIRCUIT & 3/LT BAG W/FILTER (20EA/CA)

## (undated) DEVICE — GOWN WARMING STANDARD FLEX - (30/CA)

## (undated) DEVICE — GRAFT MESH SEPRAFILM PRO PACK - 5/BX CONTAINS 6 3X5 PIECES

## (undated) DEVICE — GLOVE SZ 7.5 BIOGEL PI MICRO - PF LF (50PR/BX)

## (undated) DEVICE — PROTECTOR ULNA NERVE - (36PR/CA)

## (undated) DEVICE — GLOVE BIOGEL PI ORTHO SZ 6 SURGICAL PF LF (40PR/BX)

## (undated) DEVICE — SUCTION INSTRUMENT YANKAUER BULBOUS TIP W/O VENT (50EA/CA)

## (undated) DEVICE — GLOVE BIOGEL PI INDICATOR SZ 7.5 SURGICAL PF LF -(50/BX 4BX/CA)

## (undated) DEVICE — SET SUCT.W/SLEEVE VIA-GUARD - (10/BX10BX/CA)

## (undated) DEVICE — MASK ANESTHESIA ADULT  - (100/CA)

## (undated) DEVICE — SUTURE 1 PDS II PLUS TP-1 - (12PK/BX)

## (undated) DEVICE — CANISTER SUCTION 3000ML MECHANICAL FILTER AUTO SHUTOFF MEDI-VAC NONSTERILE LF DISP  (40EA/CA)

## (undated) DEVICE — DRAPE IOBAN II INCISE 23X17 - (10EA/BX 4BX/CA)